# Patient Record
Sex: FEMALE | Race: WHITE | NOT HISPANIC OR LATINO | Employment: OTHER | URBAN - METROPOLITAN AREA
[De-identification: names, ages, dates, MRNs, and addresses within clinical notes are randomized per-mention and may not be internally consistent; named-entity substitution may affect disease eponyms.]

---

## 2017-07-26 ENCOUNTER — HOSPITAL ENCOUNTER (EMERGENCY)
Facility: HOSPITAL | Age: 52
Discharge: HOME | End: 2017-07-26
Attending: EMERGENCY MEDICINE | Admitting: EMERGENCY MEDICINE

## 2017-07-26 ENCOUNTER — APPOINTMENT (OUTPATIENT)
Dept: CT IMAGING | Facility: HOSPITAL | Age: 52
End: 2017-07-26
Attending: EMERGENCY MEDICINE

## 2017-07-26 VITALS
TEMPERATURE: 98 F | BODY MASS INDEX: 18.48 KG/M2 | HEART RATE: 69 BPM | OXYGEN SATURATION: 98 % | HEIGHT: 66 IN | SYSTOLIC BLOOD PRESSURE: 126 MMHG | RESPIRATION RATE: 20 BRPM | DIASTOLIC BLOOD PRESSURE: 79 MMHG | WEIGHT: 115 LBS

## 2017-07-26 DIAGNOSIS — R42 DIZZY: Primary | ICD-10-CM

## 2017-07-26 LAB
ALBUMIN SERPL-MCNC: 3.9 G/DL (ref 3.5–5.2)
ALBUMIN/GLOB SERPL: 1.5 G/DL
ALP SERPL-CCNC: 68 U/L (ref 40–129)
ALT SERPL W P-5'-P-CCNC: 14 U/L (ref 5–33)
AMPHET+METHAMPHET UR QL: NEGATIVE
AMPHETAMINES UR QL: NEGATIVE
ANION GAP SERPL CALCULATED.3IONS-SCNC: 10.4 MMOL/L
AST SERPL-CCNC: 18 U/L (ref 5–32)
BARBITURATES UR QL SCN: NEGATIVE
BASOPHILS # BLD AUTO: 0.07 10*3/MM3 (ref 0–0.2)
BASOPHILS NFR BLD AUTO: 0.8 % (ref 0–2)
BENZODIAZ UR QL SCN: NEGATIVE
BILIRUB SERPL-MCNC: 0.2 MG/DL (ref 0.2–1.2)
BUN BLD-MCNC: 5 MG/DL (ref 6–20)
BUN/CREAT SERPL: 8.2 (ref 7–25)
BUPRENORPHINE SERPL-MCNC: NEGATIVE NG/ML
CALCIUM SPEC-SCNC: 8.8 MG/DL (ref 8.6–10.5)
CANNABINOIDS SERPL QL: NEGATIVE
CHLORIDE SERPL-SCNC: 103 MMOL/L (ref 98–107)
CO2 SERPL-SCNC: 28.6 MMOL/L (ref 22–29)
COCAINE UR QL: NEGATIVE
CREAT BLD-MCNC: 0.61 MG/DL (ref 0.57–1)
DEPRECATED RDW RBC AUTO: 44.8 FL (ref 37–54)
EOSINOPHIL # BLD AUTO: 0.24 10*3/MM3 (ref 0.1–0.3)
EOSINOPHIL NFR BLD AUTO: 2.8 % (ref 0–4)
ERYTHROCYTE [DISTWIDTH] IN BLOOD BY AUTOMATED COUNT: 13.2 % (ref 11.5–14.5)
GFR SERPL CREATININE-BSD FRML MDRD: 103 ML/MIN/1.73
GLOBULIN UR ELPH-MCNC: 2.6 GM/DL
GLUCOSE BLD-MCNC: 91 MG/DL (ref 65–99)
HCT VFR BLD AUTO: 40.5 % (ref 37–47)
HGB BLD-MCNC: 13.2 G/DL (ref 12–16)
IMM GRANULOCYTES # BLD: 0.02 10*3/MM3 (ref 0–0.03)
IMM GRANULOCYTES NFR BLD: 0.2 % (ref 0–0.5)
LYMPHOCYTES # BLD AUTO: 2.31 10*3/MM3 (ref 0.6–4.8)
LYMPHOCYTES NFR BLD AUTO: 27.2 % (ref 20–45)
MCH RBC QN AUTO: 30.1 PG (ref 27–31)
MCHC RBC AUTO-ENTMCNC: 32.6 G/DL (ref 31–37)
MCV RBC AUTO: 92.5 FL (ref 81–99)
METHADONE UR QL SCN: NEGATIVE
MONOCYTES # BLD AUTO: 0.61 10*3/MM3 (ref 0–1)
MONOCYTES NFR BLD AUTO: 7.2 % (ref 3–8)
NEUTROPHILS # BLD AUTO: 5.24 10*3/MM3 (ref 1.5–8.3)
NEUTROPHILS NFR BLD AUTO: 61.8 % (ref 45–70)
NRBC BLD MANUAL-RTO: 0 /100 WBC (ref 0–0)
OPIATES UR QL: POSITIVE
OXYCODONE UR QL SCN: NEGATIVE
PCP UR QL SCN: NEGATIVE
PLATELET # BLD AUTO: 229 10*3/MM3 (ref 140–500)
PMV BLD AUTO: 10.1 FL (ref 7.4–10.4)
POTASSIUM BLD-SCNC: 4.3 MMOL/L (ref 3.5–5.2)
PROPOXYPH UR QL: NEGATIVE
PROT SERPL-MCNC: 6.5 G/DL (ref 6–8.5)
RBC # BLD AUTO: 4.38 10*6/MM3 (ref 4.2–5.4)
SODIUM BLD-SCNC: 142 MMOL/L (ref 136–145)
TRICYCLICS UR QL SCN: NEGATIVE
TROPONIN T SERPL-MCNC: <0.01 NG/ML (ref 0–0.03)
WBC NRBC COR # BLD: 8.49 10*3/MM3 (ref 4.8–10.8)

## 2017-07-26 PROCEDURE — 99284 EMERGENCY DEPT VISIT MOD MDM: CPT | Performed by: EMERGENCY MEDICINE

## 2017-07-26 PROCEDURE — 85025 COMPLETE CBC W/AUTO DIFF WBC: CPT | Performed by: EMERGENCY MEDICINE

## 2017-07-26 PROCEDURE — 93010 ELECTROCARDIOGRAM REPORT: CPT | Performed by: INTERNAL MEDICINE

## 2017-07-26 PROCEDURE — 80053 COMPREHEN METABOLIC PANEL: CPT | Performed by: EMERGENCY MEDICINE

## 2017-07-26 PROCEDURE — 84484 ASSAY OF TROPONIN QUANT: CPT | Performed by: EMERGENCY MEDICINE

## 2017-07-26 PROCEDURE — 99283 EMERGENCY DEPT VISIT LOW MDM: CPT

## 2017-07-26 PROCEDURE — 70450 CT HEAD/BRAIN W/O DYE: CPT

## 2017-07-26 PROCEDURE — 80306 DRUG TEST PRSMV INSTRMNT: CPT | Performed by: EMERGENCY MEDICINE

## 2017-07-26 PROCEDURE — 93005 ELECTROCARDIOGRAM TRACING: CPT | Performed by: EMERGENCY MEDICINE

## 2017-07-26 RX ORDER — CLONAZEPAM 0.5 MG/1
0.5 TABLET ORAL 2 TIMES DAILY PRN
Status: ON HOLD | COMMUNITY
End: 2019-02-27

## 2017-07-26 RX ORDER — MECLIZINE HCL 25MG 25 MG/1
25 TABLET, CHEWABLE ORAL DAILY PRN
Status: ON HOLD | COMMUNITY
End: 2019-02-27

## 2017-07-26 RX ORDER — ESCITALOPRAM OXALATE 20 MG/1
20 TABLET ORAL DAILY
Status: ON HOLD | COMMUNITY
End: 2019-02-27

## 2017-07-26 RX ORDER — MECLIZINE HYDROCHLORIDE 25 MG/1
25 TABLET ORAL 3 TIMES DAILY PRN
Qty: 30 TABLET | Refills: 0 | Status: ON HOLD | OUTPATIENT
Start: 2017-07-26 | End: 2019-02-27

## 2017-07-26 RX ORDER — HYDROCODONE BITARTRATE AND IBUPROFEN 7.5; 2 MG/1; MG/1
1 TABLET, FILM COATED ORAL EVERY 8 HOURS PRN
Status: ON HOLD | COMMUNITY
End: 2019-02-27

## 2017-07-26 RX ORDER — DEXTROAMPHETAMINE SACCHARATE, AMPHETAMINE ASPARTATE, DEXTROAMPHETAMINE SULFATE AND AMPHETAMINE SULFATE 2.5; 2.5; 2.5; 2.5 MG/1; MG/1; MG/1; MG/1
10 TABLET ORAL DAILY
COMMUNITY
End: 2020-05-13

## 2017-07-26 NOTE — ED NOTES
"Pt reports she has h/o staph in her face and fingers.  \"It feels real hard.  If it gets in my food it gets hard.  I can feel it under my fingernails, and it's hard\".     Marie Rand RN  07/26/17 5241    "

## 2017-07-26 NOTE — DISCHARGE INSTRUCTIONS
Follow-up with Dr. collado on August 1 as scheduled.  Return to the emergency department if you have worsening dizziness, headache, numbness, tingling, weakness, chest pain, shortness of breath, worse in any way at all.

## 2017-07-26 NOTE — ED PROVIDER NOTES
"Subjective   History of Present Illness  History of Present Illness    Chief complaint: Dizziness    Location: Home    Quality/Severity:  Moderate, feels like she is drunk intermittently, last 2-3 minutes at a time    Timing/Onset/Duration: This is been going on for last 2 weeks    Modifying Factors: Time makes it better, nothing makes it worse    Associated Symptoms: Patient denies any headache.  No fever chills or cough.  No sore throat earache or nasal congestion.  pain or shortness of breath.  No abdominal pain.  No diarrhea or burning when she urinates.  No change in the color of her stools.  The patient denies any meds or dosage changes.    Narrative: Is 51-year-old white female presents complaining of intermittent episodes of feeling like she is \"drunk\".  This is been going on for last 2 weeks.  The episodes last 2-3 minutes.  Sounds but not lost consciousness.  She denies any headache.  No neck or back pain.  No chest pain or shortness of breath.  No abdominal pain.  No diarrhea or burning when she urinates.  No change in color of her stools.  No heavy menstrual periods.  Has been no meds added urgently did or dosage changes.  There is no tinnitus.    PCP:  Sina      Review of Systems   Constitutional: Negative for chills and fever.   HENT: Negative for congestion, ear pain, sore throat and tinnitus.    Eyes: Negative for pain and discharge.   Respiratory: Negative for cough, chest tightness, shortness of breath and wheezing.    Cardiovascular: Negative for chest pain and palpitations.   Gastrointestinal: Negative for abdominal pain, blood in stool, constipation, diarrhea, nausea and vomiting.   Genitourinary: Negative for decreased urine volume, dysuria, flank pain, frequency, genital sores, hematuria, menstrual problem, pelvic pain, urgency, vaginal bleeding, vaginal discharge and vaginal pain.   Musculoskeletal: Negative for back pain.   Skin: Negative for rash.   Neurological: Positive for dizziness. " Negative for syncope, facial asymmetry, speech difficulty, weakness, numbness and headaches.   Hematological: Negative for adenopathy.   Psychiatric/Behavioral: Negative for confusion.        Medication List      CHANGE how you take these medications          * meclizine 25 MG chewable tablet chewable tablet   What changed:  Another medication with the same name was added. Make sure   you understand how and when to take each.       * meclizine 25 MG tablet   Commonly known as:  ANTIVERT   Take 1 tablet by mouth 3 (Three) Times a Day As Needed for dizziness.   What changed:  You were already taking a medication with the same name,   and this prescription was added. Make sure you understand how and when to   take each.       * Notice:  This list has 2 medication(s) that are the same as other   medications prescribed for you. Read the directions carefully, and ask   your doctor or other care provider to review them with you.      CONTINUE taking these medications          amphetamine-dextroamphetamine 10 MG tablet   Commonly known as:  ADDERALL       escitalopram 20 MG tablet   Commonly known as:  LEXAPRO       KLONOPIN 0.5 MG tablet   Generic drug:  clonazePAM       VICOPROFEN 7.5-200 MG per tablet   Generic drug:  HYDROcodone-ibuprofen       VYVANSE 10 MG capsule   Generic drug:  lisdexamfetamine dimesylate           Past Medical History:   Diagnosis Date   • ADHD (attention deficit hyperactivity disorder)    • Anxiety    • Back pain    • DDD (degenerative disc disease), cervical    • MRSA (methicillin resistant staph aureus) culture positive    • Neck pain    • Vertigo 2017       No Known Allergies    Past Surgical History:   Procedure Laterality Date   • HYSTERECTOMY     • OVARIAN CYST REMOVAL         History reviewed. No pertinent family history.    Social History     Social History   • Marital status:      Spouse name: N/A   • Number of children: N/A   • Years of education: N/A     Social History Main  Topics   • Smoking status: Current Every Day Smoker     Packs/day: 1.50     Years: 40.00     Types: Cigarettes   • Smokeless tobacco: Never Used   • Alcohol use No   • Drug use: No   • Sexual activity: Not Asked     Other Topics Concern   • None     Social History Narrative   • None           Objective   Physical Exam   Constitutional: She is oriented to person, place, and time. She appears well-developed and well-nourished. No distress.   ED Triage Vitals:  Temp: 98 °F (36.7 °C) (07/26/17 1203)  Heart Rate: 78 (07/26/17 1203)  Resp: 20 (07/26/17 1203)  BP: 125/76 (07/26/17 1203)  SpO2: 96 % (07/26/17 1203)  Temp src: Oral (07/26/17 1203)  Heart Rate Source: Monitor (07/26/17 1203)  Patient Position: Lying (07/26/17 1203)  BP Location: Right arm (07/26/17 1203)  FiO2 (%): n/a    The patient's vitals were reviewed by me.  Unless otherwise noted they are within normal limits.     HENT:   Head: Normocephalic and atraumatic.   Right Ear: External ear normal.   Left Ear: External ear normal.   Nose: Nose normal.   Mouth/Throat: Oropharynx is clear and moist.   Eyes: Conjunctivae and EOM are normal. Pupils are equal, round, and reactive to light. Right eye exhibits no discharge. Left eye exhibits no discharge.   Neck: Normal range of motion. Neck supple. No JVD present. No tracheal deviation present. No thyromegaly present.   No carotid bruits   Cardiovascular: Normal rate, regular rhythm, normal heart sounds and intact distal pulses.  Exam reveals no gallop and no friction rub.    No murmur heard.  Pulmonary/Chest: Effort normal and breath sounds normal. No stridor. No respiratory distress. She has no wheezes. She has no rales. She exhibits no tenderness.   Abdominal: Soft. Bowel sounds are normal. She exhibits no distension and no mass. There is no tenderness. There is no rebound and no guarding. No hernia.   Musculoskeletal: Normal range of motion. She exhibits no edema or deformity.   Lymphadenopathy:     She has no  cervical adenopathy.   Neurological: She is alert and oriented to person, place, and time. No cranial nerve deficit. She exhibits normal muscle tone. Coordination normal.   Skin: Skin is warm and dry. No rash noted. She is not diaphoretic. No erythema. No pallor.   Psychiatric: Her behavior is normal.   Nursing note and vitals reviewed.      Procedures         ED Course  ED Course   Comment By Time   The laboratory values were reviewed by me.  The opiate screen is positive.  The laboratory values are otherwise unremarkable. Clement May MD 07/26 1445    3:29 AM, 07/28/17:  The EKG was obtained at 1216.  The EKG was interpreted by me at 1216.  EKG shows a normal sinus rhythm with a rate of 69.  There is a normal axis.  There is left ventricular hypertrophy.  The AK, QRS and QT intervals are unremarkable.  There is no ectopy.  There is no acute ST elevation or depression.  3:29 AM, 07/28/17:  The patient's orthostatics are negative.    3:29 AM, 07/28/17:  Patient was reassessed.  She feels better.  Her vital signs were reviewed and are stable.    Neurological exam: Conscious alert oriented ×4 with no focal deficit is noted.    3:29 AM, 07/28/17:  The patient's diagnosis of dizziness was discussed with the patient.  The patient will be given a prescription for meclizine.  She should follow-up with Dr. Andino as scheduled on August 1.  Her dizziness may be due to the medications that she is on and these may need to be adjusted by her primary care provider.  Patient is on Adderall, Klonopin, Lexapro, hydrocodone, all of which may cause dizziness.  The patient should return to the emergency department if she has worsening dizziness, worse in any way at all.          MDM    Final diagnoses:   Dizzy            Clement May MD  CT Head Without Contrast   ED Interpretation   CT of the head as read by Dr. King Foley shows no acute   intracranial findings.      Final Result   No acute intracranial findings       This  report was finalized on 7/26/2017 3:47 PM by Dr. King Foley MD.            Labs Reviewed   COMPREHENSIVE METABOLIC PANEL - Abnormal; Notable for the following:        Result Value    BUN 5 (*)     All other components within normal limits   URINE DRUG SCREEN - Abnormal; Notable for the following:     Opiate Screen Positive (*)     All other components within normal limits    Narrative:     Urine drug screen results are to be used for medical purposes only.  They are not to be used for legal purposes such as employment testing.  Negative results do not necessarily mean the complete absence of a subtance, but rather that the result is less than the cutoff for that substance.  Positive results are unconfirmed and considered Preliminary Positive.  [facility] does not automatically confirm Postitive Unconfirmed results.  The physician may request (order) an Unconfirmed Positive result to be sent out for confirmation.      Negative Thresholds for Drugs Screened:    THC screen, urine                          50 ng/ml  Phenycyclidine (PCP), urine                25 ng/ml  Cocaine screen, urine                     150 ng/ml  Methamphetamine, urine                    500 ng/ml  Opiate screen, urine                      100 ng/ml  Amphetamine screen, urine                 500 ng/ml  Benzodiazepine screen, urine              150 ng/ml  Tricyclic Antidepressants screen, urine   300 ng/ml  Methadone screen, urine                   200 ng/ml  Barbiturates screen, urine                200 ng/ml  Oxycodone screen, urine                   100 ng/ml  Propoxyphene screen, urine                300 ng/ml  Buprenorphine screen, urine                10 ng/ml   TROPONIN (IN-HOUSE) - Normal    Narrative:     Troponin T Reference Ranges:  Less than 0.03 ng/mL:    Negative for AMI  0.03 to 0.09 ng/mL:      Indeterminant for AMI  Greater than 0.09 ng/mL: Positive for AMI   CBC WITH AUTO DIFFERENTIAL - Normal   CBC AND  DIFFERENTIAL    Narrative:     The following orders were created for panel order CBC & Differential.  Procedure                               Abnormality         Status                     ---------                               -----------         ------                     CBC Auto Differential[[id number]]        Normal              Final result                 Please view results for these tests on the individual orders.     Ct Head Without Contrast    Result Date: 7/26/2017  Narrative: INDICATION: Dizziness and weakness for one day. Vertigo.  TECHNIQUE: CT head without contrast.  Radiation dose reduction techniques were utilized, including automated exposure control and exposure modulation based on body size.  COMPARISON: None available..  FINDINGS: No acute intracranial hemorrhage, mass lesion, or acute infarct. Gray-white matter differentiation is normal. The ventricles and basilar cisterns are normal in size and configuration. No extra-axial collections.  No acute osseous abnormalities. The visualized paranasal sinuses and mastoid air cells are clear.      Impression: No acute intracranial findings  This report was finalized on 7/26/2017 3:47 PM by Dr. King Foley MD.        Final diagnoses:   Dizzy         ED Medications:  Medications - No data to display    New Medications:     Medication List      CHANGE how you take these medications          * meclizine 25 MG chewable tablet chewable tablet   What changed:  Another medication with the same name was added. Make sure   you understand how and when to take each.       * meclizine 25 MG tablet   Commonly known as:  ANTIVERT   Take 1 tablet by mouth 3 (Three) Times a Day As Needed for dizziness.   What changed:  You were already taking a medication with the same name,   and this prescription was added. Make sure you understand how and when to   take each.       * Notice:  This list has 2 medication(s) that are the same as other   medications prescribed for  you. Read the directions carefully, and ask   your doctor or other care provider to review them with you.      CONTINUE taking these medications          amphetamine-dextroamphetamine 10 MG tablet   Commonly known as:  ADDERALL       escitalopram 20 MG tablet   Commonly known as:  LEXAPRO       KLONOPIN 0.5 MG tablet   Generic drug:  clonazePAM       VICOPROFEN 7.5-200 MG per tablet   Generic drug:  HYDROcodone-ibuprofen       VYVANSE 10 MG capsule   Generic drug:  lisdexamfetamine dimesylate           Stopped Medications:     Medication List      CHANGE how you take these medications          * meclizine 25 MG chewable tablet chewable tablet   What changed:  Another medication with the same name was added. Make sure   you understand how and when to take each.       * meclizine 25 MG tablet   Commonly known as:  ANTIVERT   Take 1 tablet by mouth 3 (Three) Times a Day As Needed for dizziness.   What changed:  You were already taking a medication with the same name,   and this prescription was added. Make sure you understand how and when to   take each.       * Notice:  This list has 2 medication(s) that are the same as other   medications prescribed for you. Read the directions carefully, and ask   your doctor or other care provider to review them with you.      CONTINUE taking these medications          amphetamine-dextroamphetamine 10 MG tablet   Commonly known as:  ADDERALL       escitalopram 20 MG tablet   Commonly known as:  LEXAPRO       KLONOPIN 0.5 MG tablet   Generic drug:  clonazePAM       VICOPROFEN 7.5-200 MG per tablet   Generic drug:  HYDROcodone-ibuprofen       VYVANSE 10 MG capsule   Generic drug:  lisdexamfetamine dimesylate           07/26/17 1422       Clement May MD  07/26/17 1459       Clement May MD  07/28/17 8502

## 2017-07-26 NOTE — ED NOTES
Pt reports cough x2 weeks, dry, nonproductive, chills, fatigue.       Marie Rand RN  07/26/17 8227

## 2018-01-22 ENCOUNTER — LAB (OUTPATIENT)
Dept: LAB | Facility: HOSPITAL | Age: 53
End: 2018-01-22
Attending: FAMILY MEDICINE

## 2018-01-22 ENCOUNTER — TRANSCRIBE ORDERS (OUTPATIENT)
Dept: ADMINISTRATIVE | Facility: HOSPITAL | Age: 53
End: 2018-01-22

## 2018-01-22 ENCOUNTER — HOSPITAL ENCOUNTER (OUTPATIENT)
Dept: GENERAL RADIOLOGY | Facility: HOSPITAL | Age: 53
Discharge: HOME | End: 2018-01-22
Attending: FAMILY MEDICINE | Admitting: FAMILY MEDICINE

## 2018-01-22 DIAGNOSIS — J40 BRONCHITIS: Primary | ICD-10-CM

## 2018-01-22 DIAGNOSIS — J40 BRONCHITIS: ICD-10-CM

## 2018-01-22 PROCEDURE — 87070 CULTURE OTHR SPECIMN AEROBIC: CPT

## 2018-01-22 PROCEDURE — 87205 SMEAR GRAM STAIN: CPT

## 2018-01-22 PROCEDURE — 71046 X-RAY EXAM CHEST 2 VIEWS: CPT

## 2018-01-24 LAB
BACTERIA SPEC RESP CULT: NORMAL
BACTERIA SPEC RESP CULT: NORMAL
GRAM STN SPEC: NORMAL

## 2019-02-26 ENCOUNTER — APPOINTMENT (OUTPATIENT)
Dept: GENERAL RADIOLOGY | Facility: HOSPITAL | Age: 54
End: 2019-02-26

## 2019-02-26 ENCOUNTER — HOSPITAL ENCOUNTER (INPATIENT)
Facility: HOSPITAL | Age: 54
LOS: 3 days | Discharge: HOME | End: 2019-03-02
Attending: EMERGENCY MEDICINE | Admitting: INTERNAL MEDICINE

## 2019-02-26 DIAGNOSIS — N12 PYELONEPHRITIS: ICD-10-CM

## 2019-02-26 DIAGNOSIS — A41.9 SEPSIS, DUE TO UNSPECIFIED ORGANISM: Primary | ICD-10-CM

## 2019-02-26 PROCEDURE — 87077 CULTURE AEROBIC IDENTIFY: CPT | Performed by: EMERGENCY MEDICINE

## 2019-02-26 PROCEDURE — 99285 EMERGENCY DEPT VISIT HI MDM: CPT

## 2019-02-26 PROCEDURE — 83605 ASSAY OF LACTIC ACID: CPT | Performed by: EMERGENCY MEDICINE

## 2019-02-26 PROCEDURE — 87150 DNA/RNA AMPLIFIED PROBE: CPT | Performed by: EMERGENCY MEDICINE

## 2019-02-26 PROCEDURE — 81015 MICROSCOPIC EXAM OF URINE: CPT | Performed by: EMERGENCY MEDICINE

## 2019-02-26 PROCEDURE — 87040 BLOOD CULTURE FOR BACTERIA: CPT | Performed by: EMERGENCY MEDICINE

## 2019-02-26 PROCEDURE — 71046 X-RAY EXAM CHEST 2 VIEWS: CPT

## 2019-02-26 PROCEDURE — 85025 COMPLETE CBC W/AUTO DIFF WBC: CPT | Performed by: EMERGENCY MEDICINE

## 2019-02-26 PROCEDURE — 81003 URINALYSIS AUTO W/O SCOPE: CPT | Performed by: EMERGENCY MEDICINE

## 2019-02-26 PROCEDURE — 87186 SC STD MICRODIL/AGAR DIL: CPT | Performed by: EMERGENCY MEDICINE

## 2019-02-26 PROCEDURE — 80053 COMPREHEN METABOLIC PANEL: CPT | Performed by: EMERGENCY MEDICINE

## 2019-02-26 RX ORDER — IBUPROFEN 400 MG/1
800 TABLET ORAL ONCE
Status: COMPLETED | OUTPATIENT
Start: 2019-02-26 | End: 2019-02-27

## 2019-02-26 RX ORDER — SODIUM CHLORIDE 0.9 % (FLUSH) 0.9 %
10 SYRINGE (ML) INJECTION AS NEEDED
Status: DISCONTINUED | OUTPATIENT
Start: 2019-02-26 | End: 2019-03-02 | Stop reason: HOSPADM

## 2019-02-27 ENCOUNTER — APPOINTMENT (OUTPATIENT)
Dept: CT IMAGING | Facility: HOSPITAL | Age: 54
End: 2019-02-27

## 2019-02-27 PROBLEM — A41.9 SEPSIS (HCC): Status: ACTIVE | Noted: 2019-02-27

## 2019-02-27 LAB
ALBUMIN SERPL-MCNC: 3.2 G/DL (ref 3.5–5.2)
ALBUMIN/GLOB SERPL: 0.9 G/DL
ALP SERPL-CCNC: 89 U/L (ref 40–129)
ALT SERPL W P-5'-P-CCNC: 16 U/L (ref 5–33)
ANION GAP SERPL CALCULATED.3IONS-SCNC: 13 MMOL/L
AST SERPL-CCNC: 19 U/L (ref 5–32)
BACTERIA BLD CULT: ABNORMAL
BACTERIA UR QL AUTO: ABNORMAL /HPF
BASOPHILS # BLD AUTO: 0.03 10*3/MM3 (ref 0–0.2)
BASOPHILS NFR BLD AUTO: 0.3 % (ref 0–1.5)
BILIRUB SERPL-MCNC: 0.9 MG/DL (ref 0.2–1.2)
BILIRUB UR QL STRIP: NEGATIVE
BUN BLD-MCNC: 10 MG/DL (ref 6–20)
BUN/CREAT SERPL: 9.4 (ref 7–25)
CALCIUM SPEC-SCNC: 8 MG/DL (ref 8.6–10.5)
CHLORIDE SERPL-SCNC: 94 MMOL/L (ref 98–107)
CLARITY UR: ABNORMAL
CO2 SERPL-SCNC: 24 MMOL/L (ref 22–29)
COLOR UR: YELLOW
CREAT BLD-MCNC: 1.06 MG/DL (ref 0.57–1)
D-LACTATE SERPL-SCNC: 0.7 MMOL/L (ref 0.5–2)
D-LACTATE SERPL-SCNC: 2.3 MMOL/L (ref 0.5–2)
DEPRECATED RDW RBC AUTO: 45.1 FL (ref 37–54)
EOSINOPHIL # BLD AUTO: 0.03 10*3/MM3 (ref 0–0.4)
EOSINOPHIL NFR BLD AUTO: 0.3 % (ref 0.3–6.2)
ERYTHROCYTE [DISTWIDTH] IN BLOOD BY AUTOMATED COUNT: 13 % (ref 12.3–15.4)
FLUAV AG NPH QL: NEGATIVE
FLUBV AG NPH QL IA: NEGATIVE
GFR SERPL CREATININE-BSD FRML MDRD: 54 ML/MIN/1.73
GLOBULIN UR ELPH-MCNC: 3.5 GM/DL
GLUCOSE BLD-MCNC: 93 MG/DL (ref 65–99)
GLUCOSE UR STRIP-MCNC: NEGATIVE MG/DL
HCT VFR BLD AUTO: 42.9 % (ref 34–46.6)
HGB BLD-MCNC: 13.7 G/DL (ref 12–15.9)
HGB UR QL STRIP.AUTO: ABNORMAL
HOLD SPECIMEN: NORMAL
HYALINE CASTS UR QL AUTO: ABNORMAL /LPF
IMM GRANULOCYTES # BLD AUTO: 0.1 10*3/MM3 (ref 0–0.05)
IMM GRANULOCYTES NFR BLD AUTO: 0.8 % (ref 0–0.5)
KETONES UR QL STRIP: NEGATIVE
LEUKOCYTE ESTERASE UR QL STRIP.AUTO: ABNORMAL
LYMPHOCYTES # BLD AUTO: 0.59 10*3/MM3 (ref 0.7–3.1)
LYMPHOCYTES NFR BLD AUTO: 5 % (ref 19.6–45.3)
MCH RBC QN AUTO: 30.2 PG (ref 26.6–33)
MCHC RBC AUTO-ENTMCNC: 31.9 G/DL (ref 31.5–35.7)
MCV RBC AUTO: 94.7 FL (ref 79–97)
MONOCYTES # BLD AUTO: 0.98 10*3/MM3 (ref 0.1–0.9)
MONOCYTES NFR BLD AUTO: 8.3 % (ref 5–12)
NEUTROPHILS # BLD AUTO: 10.09 10*3/MM3 (ref 1.4–7)
NEUTROPHILS NFR BLD AUTO: 85.3 % (ref 42.7–76)
NITRITE UR QL STRIP: POSITIVE
NRBC BLD AUTO-RTO: 0 /100 WBC (ref 0–0)
PH UR STRIP.AUTO: 6 [PH] (ref 4.5–8)
PLATELET # BLD AUTO: 143 10*3/MM3 (ref 140–450)
PMV BLD AUTO: 11.7 FL (ref 6–12)
POTASSIUM BLD-SCNC: 3.5 MMOL/L (ref 3.5–5.2)
PROT SERPL-MCNC: 6.7 G/DL (ref 6–8.5)
PROT UR QL STRIP: ABNORMAL
RBC # BLD AUTO: 4.53 10*6/MM3 (ref 3.77–5.28)
RBC # UR: ABNORMAL /HPF
REF LAB TEST METHOD: ABNORMAL
SODIUM BLD-SCNC: 131 MMOL/L (ref 136–145)
SP GR UR STRIP: 1.02 (ref 1–1.03)
SQUAMOUS #/AREA URNS HPF: ABNORMAL /HPF
UROBILINOGEN UR QL STRIP: ABNORMAL
WBC NRBC COR # BLD: 11.82 10*3/MM3 (ref 3.4–10.8)
WBC UR QL AUTO: ABNORMAL /HPF
WHOLE BLOOD HOLD SPECIMEN: NORMAL
WHOLE BLOOD HOLD SPECIMEN: NORMAL

## 2019-02-27 PROCEDURE — 87804 INFLUENZA ASSAY W/OPTIC: CPT | Performed by: EMERGENCY MEDICINE

## 2019-02-27 PROCEDURE — 25010000002 ONDANSETRON PER 1 MG: Performed by: FAMILY MEDICINE

## 2019-02-27 PROCEDURE — 25010000002 CEFTRIAXONE SODIUM-DEXTROSE 1-3.74 GM-%(50ML) RECONSTITUTED SOLUTION: Performed by: FAMILY MEDICINE

## 2019-02-27 PROCEDURE — 94799 UNLISTED PULMONARY SVC/PX: CPT

## 2019-02-27 PROCEDURE — 74177 CT ABD & PELVIS W/CONTRAST: CPT

## 2019-02-27 PROCEDURE — 25010000002 CEFTRIAXONE SODIUM-DEXTROSE 1-3.74 GM-%(50ML) RECONSTITUTED SOLUTION: Performed by: EMERGENCY MEDICINE

## 2019-02-27 PROCEDURE — 25010000002 KETOROLAC TROMETHAMINE PER 15 MG: Performed by: FAMILY MEDICINE

## 2019-02-27 PROCEDURE — 25010000002 IOPAMIDOL 61 % SOLUTION: Performed by: FAMILY MEDICINE

## 2019-02-27 PROCEDURE — 83605 ASSAY OF LACTIC ACID: CPT | Performed by: EMERGENCY MEDICINE

## 2019-02-27 PROCEDURE — 87040 BLOOD CULTURE FOR BACTERIA: CPT | Performed by: EMERGENCY MEDICINE

## 2019-02-27 PROCEDURE — 25010000002 ENOXAPARIN PER 10 MG: Performed by: FAMILY MEDICINE

## 2019-02-27 PROCEDURE — 99291 CRITICAL CARE FIRST HOUR: CPT | Performed by: EMERGENCY MEDICINE

## 2019-02-27 RX ORDER — SODIUM CHLORIDE 9 MG/ML
75 INJECTION, SOLUTION INTRAVENOUS CONTINUOUS
Status: DISCONTINUED | OUTPATIENT
Start: 2019-02-27 | End: 2019-03-01

## 2019-02-27 RX ORDER — HYDROCODONE BITARTRATE AND ACETAMINOPHEN 5; 325 MG/1; MG/1
1 TABLET ORAL ONCE
Status: COMPLETED | OUTPATIENT
Start: 2019-02-27 | End: 2019-02-27

## 2019-02-27 RX ORDER — GABAPENTIN 400 MG/1
CAPSULE ORAL
Status: COMPLETED
Start: 2019-02-27 | End: 2019-02-27

## 2019-02-27 RX ORDER — CEFTRIAXONE 1 G/50ML
1 INJECTION, SOLUTION INTRAVENOUS
Status: DISCONTINUED | OUTPATIENT
Start: 2019-02-27 | End: 2019-02-27 | Stop reason: CLARIF

## 2019-02-27 RX ORDER — TRAMADOL HYDROCHLORIDE 50 MG/1
50 TABLET ORAL EVERY 6 HOURS PRN
Status: DISCONTINUED | OUTPATIENT
Start: 2019-02-27 | End: 2019-03-02 | Stop reason: HOSPADM

## 2019-02-27 RX ORDER — KETOROLAC TROMETHAMINE 30 MG/ML
30 INJECTION, SOLUTION INTRAMUSCULAR; INTRAVENOUS EVERY 6 HOURS PRN
Status: DISCONTINUED | OUTPATIENT
Start: 2019-02-27 | End: 2019-02-27

## 2019-02-27 RX ORDER — KETOROLAC TROMETHAMINE 30 MG/ML
INJECTION, SOLUTION INTRAMUSCULAR; INTRAVENOUS
Status: DISPENSED
Start: 2019-02-27 | End: 2019-02-28

## 2019-02-27 RX ORDER — ACETAMINOPHEN 325 MG/1
650 TABLET ORAL EVERY 4 HOURS PRN
Status: DISCONTINUED | OUTPATIENT
Start: 2019-02-27 | End: 2019-03-02 | Stop reason: HOSPADM

## 2019-02-27 RX ORDER — CEFTRIAXONE 1 G/50ML
1 INJECTION, SOLUTION INTRAVENOUS EVERY 24 HOURS
Status: DISCONTINUED | OUTPATIENT
Start: 2019-02-27 | End: 2019-03-02 | Stop reason: HOSPADM

## 2019-02-27 RX ORDER — GABAPENTIN 800 MG/1
800 TABLET ORAL 3 TIMES DAILY
COMMUNITY
End: 2020-05-13

## 2019-02-27 RX ORDER — CEFTRIAXONE 1 G/50ML
1 INJECTION, SOLUTION INTRAVENOUS ONCE
Status: COMPLETED | OUTPATIENT
Start: 2019-02-27 | End: 2019-02-27

## 2019-02-27 RX ORDER — ONDANSETRON 2 MG/ML
4 INJECTION INTRAMUSCULAR; INTRAVENOUS EVERY 8 HOURS PRN
Status: DISCONTINUED | OUTPATIENT
Start: 2019-02-27 | End: 2019-03-02 | Stop reason: HOSPADM

## 2019-02-27 RX ORDER — GABAPENTIN 400 MG/1
800 CAPSULE ORAL 3 TIMES DAILY
Status: DISCONTINUED | OUTPATIENT
Start: 2019-02-27 | End: 2019-03-02 | Stop reason: HOSPADM

## 2019-02-27 RX ORDER — KETOROLAC TROMETHAMINE 30 MG/ML
30 INJECTION, SOLUTION INTRAMUSCULAR; INTRAVENOUS ONCE
Status: COMPLETED | OUTPATIENT
Start: 2019-02-27 | End: 2019-02-27

## 2019-02-27 RX ORDER — GABAPENTIN 400 MG/1
800 CAPSULE ORAL 3 TIMES DAILY
Status: DISCONTINUED | OUTPATIENT
Start: 2019-02-27 | End: 2019-02-27 | Stop reason: CLARIF

## 2019-02-27 RX ADMIN — IOPAMIDOL 100 ML: 612 INJECTION, SOLUTION INTRAVENOUS at 09:30

## 2019-02-27 RX ADMIN — ACETAMINOPHEN 650 MG: 325 TABLET, FILM COATED ORAL at 09:51

## 2019-02-27 RX ADMIN — SODIUM CHLORIDE 1000 ML: 9 INJECTION, SOLUTION INTRAVENOUS at 00:08

## 2019-02-27 RX ADMIN — SODIUM CHLORIDE 1000 ML: 9 INJECTION, SOLUTION INTRAVENOUS at 09:47

## 2019-02-27 RX ADMIN — SODIUM CHLORIDE 1000 ML: 9 INJECTION, SOLUTION INTRAVENOUS at 01:30

## 2019-02-27 RX ADMIN — CEFTRIAXONE 1 G: 1 INJECTION, SOLUTION INTRAVENOUS at 00:57

## 2019-02-27 RX ADMIN — IBUPROFEN 800 MG: 400 TABLET ORAL at 00:08

## 2019-02-27 RX ADMIN — ACETAMINOPHEN 650 MG: 325 TABLET, FILM COATED ORAL at 13:37

## 2019-02-27 RX ADMIN — SODIUM CHLORIDE 150 ML/HR: 9 INJECTION, SOLUTION INTRAVENOUS at 02:50

## 2019-02-27 RX ADMIN — SODIUM CHLORIDE 150 ML/HR: 9 INJECTION, SOLUTION INTRAVENOUS at 13:28

## 2019-02-27 RX ADMIN — SODIUM CHLORIDE 150 ML/HR: 9 INJECTION, SOLUTION INTRAVENOUS at 20:01

## 2019-02-27 RX ADMIN — SODIUM CHLORIDE 150 ML/HR: 9 INJECTION, SOLUTION INTRAVENOUS at 08:53

## 2019-02-27 RX ADMIN — SODIUM CHLORIDE, PRESERVATIVE FREE 10 ML: 5 INJECTION INTRAVENOUS at 09:51

## 2019-02-27 RX ADMIN — SODIUM CHLORIDE 1000 ML: 9 INJECTION, SOLUTION INTRAVENOUS at 08:17

## 2019-02-27 RX ADMIN — GABAPENTIN 800 MG: 400 CAPSULE ORAL at 18:55

## 2019-02-27 RX ADMIN — HYDROCODONE BITARTRATE AND ACETAMINOPHEN 1 TABLET: 5; 325 TABLET ORAL at 01:50

## 2019-02-27 RX ADMIN — ENOXAPARIN SODIUM 40 MG: 40 INJECTION SUBCUTANEOUS at 09:41

## 2019-02-27 RX ADMIN — ACETAMINOPHEN 650 MG: 325 TABLET, FILM COATED ORAL at 17:27

## 2019-02-27 RX ADMIN — SODIUM CHLORIDE 500 ML: 900 INJECTION, SOLUTION INTRAVENOUS at 07:37

## 2019-02-27 RX ADMIN — CEFTRIAXONE 1 G: 1 INJECTION, SOLUTION INTRAVENOUS at 18:56

## 2019-02-27 RX ADMIN — ONDANSETRON 4 MG: 2 INJECTION, SOLUTION INTRAMUSCULAR; INTRAVENOUS at 09:51

## 2019-02-27 RX ADMIN — KETOROLAC TROMETHAMINE 30 MG: 30 INJECTION INTRAMUSCULAR; INTRAVENOUS at 21:56

## 2019-02-28 LAB
ANION GAP SERPL CALCULATED.3IONS-SCNC: 10.7 MMOL/L
BUN BLD-MCNC: 8 MG/DL (ref 6–20)
BUN/CREAT SERPL: 8.9 (ref 7–25)
CALCIUM SPEC-SCNC: 7.5 MG/DL (ref 8.6–10.5)
CHLORIDE SERPL-SCNC: 111 MMOL/L (ref 98–107)
CO2 SERPL-SCNC: 22.3 MMOL/L (ref 22–29)
CREAT BLD-MCNC: 0.9 MG/DL (ref 0.57–1)
DEPRECATED RDW RBC AUTO: 45.9 FL (ref 37–54)
ERYTHROCYTE [DISTWIDTH] IN BLOOD BY AUTOMATED COUNT: 13.3 % (ref 12.3–15.4)
GFR SERPL CREATININE-BSD FRML MDRD: 65 ML/MIN/1.73
GLUCOSE BLD-MCNC: 78 MG/DL (ref 65–99)
HCT VFR BLD AUTO: 36.2 % (ref 34–46.6)
HGB BLD-MCNC: 11.5 G/DL (ref 12–15.9)
MCH RBC QN AUTO: 29.9 PG (ref 26.6–33)
MCHC RBC AUTO-ENTMCNC: 31.8 G/DL (ref 31.5–35.7)
MCV RBC AUTO: 94 FL (ref 79–97)
PLATELET # BLD AUTO: 126 10*3/MM3 (ref 140–450)
PMV BLD AUTO: 12.1 FL (ref 6–12)
POTASSIUM BLD-SCNC: 3.3 MMOL/L (ref 3.5–5.2)
RBC # BLD AUTO: 3.85 10*6/MM3 (ref 3.77–5.28)
SODIUM BLD-SCNC: 144 MMOL/L (ref 136–145)
WBC NRBC COR # BLD: 5.88 10*3/MM3 (ref 3.4–10.8)

## 2019-02-28 PROCEDURE — 25010000002 KETOROLAC TROMETHAMINE PER 15 MG: Performed by: FAMILY MEDICINE

## 2019-02-28 PROCEDURE — 85027 COMPLETE CBC AUTOMATED: CPT | Performed by: FAMILY MEDICINE

## 2019-02-28 PROCEDURE — 25010000002 ENOXAPARIN PER 10 MG: Performed by: FAMILY MEDICINE

## 2019-02-28 PROCEDURE — 80048 BASIC METABOLIC PNL TOTAL CA: CPT | Performed by: FAMILY MEDICINE

## 2019-02-28 PROCEDURE — 25010000002 CEFTRIAXONE SODIUM-DEXTROSE 1-3.74 GM-%(50ML) RECONSTITUTED SOLUTION: Performed by: FAMILY MEDICINE

## 2019-02-28 PROCEDURE — 94799 UNLISTED PULMONARY SVC/PX: CPT

## 2019-02-28 RX ORDER — POTASSIUM CHLORIDE 20 MEQ/1
40 TABLET, EXTENDED RELEASE ORAL ONCE
Status: COMPLETED | OUTPATIENT
Start: 2019-02-28 | End: 2019-02-28

## 2019-02-28 RX ORDER — PANTOPRAZOLE SODIUM 40 MG/1
40 TABLET, DELAYED RELEASE ORAL
Status: DISCONTINUED | OUTPATIENT
Start: 2019-02-28 | End: 2019-03-02 | Stop reason: HOSPADM

## 2019-02-28 RX ORDER — KETOROLAC TROMETHAMINE 30 MG/ML
30 INJECTION, SOLUTION INTRAMUSCULAR; INTRAVENOUS EVERY 6 HOURS PRN
Status: DISCONTINUED | OUTPATIENT
Start: 2019-02-28 | End: 2019-03-02 | Stop reason: HOSPADM

## 2019-02-28 RX ADMIN — ENOXAPARIN SODIUM 40 MG: 40 INJECTION SUBCUTANEOUS at 08:58

## 2019-02-28 RX ADMIN — KETOROLAC TROMETHAMINE 30 MG: 30 INJECTION, SOLUTION INTRAMUSCULAR at 08:54

## 2019-02-28 RX ADMIN — TRAMADOL HYDROCHLORIDE 50 MG: 50 TABLET, FILM COATED ORAL at 13:17

## 2019-02-28 RX ADMIN — GABAPENTIN 800 MG: 400 CAPSULE ORAL at 20:39

## 2019-02-28 RX ADMIN — PANTOPRAZOLE SODIUM 40 MG: 40 TABLET, DELAYED RELEASE ORAL at 08:48

## 2019-02-28 RX ADMIN — TRAMADOL HYDROCHLORIDE 50 MG: 50 TABLET, FILM COATED ORAL at 19:30

## 2019-02-28 RX ADMIN — TRAMADOL HYDROCHLORIDE 50 MG: 50 TABLET, FILM COATED ORAL at 05:06

## 2019-02-28 RX ADMIN — ACETAMINOPHEN 650 MG: 325 TABLET, FILM COATED ORAL at 00:28

## 2019-02-28 RX ADMIN — GABAPENTIN 800 MG: 400 CAPSULE ORAL at 16:37

## 2019-02-28 RX ADMIN — KETOROLAC TROMETHAMINE 30 MG: 30 INJECTION, SOLUTION INTRAMUSCULAR at 16:36

## 2019-02-28 RX ADMIN — SODIUM CHLORIDE 125 ML/HR: 9 INJECTION, SOLUTION INTRAVENOUS at 04:00

## 2019-02-28 RX ADMIN — POTASSIUM CHLORIDE 40 MEQ: 1500 TABLET, EXTENDED RELEASE ORAL at 07:10

## 2019-02-28 RX ADMIN — CEFTRIAXONE 1 G: 1 INJECTION, SOLUTION INTRAVENOUS at 18:27

## 2019-02-28 RX ADMIN — GABAPENTIN 800 MG: 400 CAPSULE ORAL at 08:48

## 2019-03-01 ENCOUNTER — APPOINTMENT (OUTPATIENT)
Dept: GENERAL RADIOLOGY | Facility: HOSPITAL | Age: 54
End: 2019-03-01

## 2019-03-01 LAB
ALBUMIN SERPL-MCNC: 2.4 G/DL (ref 3.5–5.2)
ALBUMIN/GLOB SERPL: 0.9 G/DL
ALP SERPL-CCNC: 169 U/L (ref 40–129)
ALT SERPL W P-5'-P-CCNC: 20 U/L (ref 5–33)
ANION GAP SERPL CALCULATED.3IONS-SCNC: 10.8 MMOL/L
AST SERPL-CCNC: 24 U/L (ref 5–32)
BACTERIA SPEC AEROBE CULT: ABNORMAL
BILIRUB SERPL-MCNC: 0.3 MG/DL (ref 0.2–1.2)
BUN BLD-MCNC: 8 MG/DL (ref 6–20)
BUN/CREAT SERPL: 10.4 (ref 7–25)
CALCIUM SPEC-SCNC: 7.3 MG/DL (ref 8.6–10.5)
CHLORIDE SERPL-SCNC: 109 MMOL/L (ref 98–107)
CO2 SERPL-SCNC: 19.2 MMOL/L (ref 22–29)
CREAT BLD-MCNC: 0.77 MG/DL (ref 0.57–1)
DEPRECATED RDW RBC AUTO: 45.8 FL (ref 37–54)
ERYTHROCYTE [DISTWIDTH] IN BLOOD BY AUTOMATED COUNT: 13.4 % (ref 12.3–15.4)
GFR SERPL CREATININE-BSD FRML MDRD: 78 ML/MIN/1.73
GLOBULIN UR ELPH-MCNC: 2.8 GM/DL
GLUCOSE BLD-MCNC: 92 MG/DL (ref 65–99)
GRAM STN SPEC: ABNORMAL
HCT VFR BLD AUTO: 32.4 % (ref 34–46.6)
HGB BLD-MCNC: 10.6 G/DL (ref 12–15.9)
ISOLATED FROM: ABNORMAL
MCH RBC QN AUTO: 29.9 PG (ref 26.6–33)
MCHC RBC AUTO-ENTMCNC: 32.7 G/DL (ref 31.5–35.7)
MCV RBC AUTO: 91.5 FL (ref 79–97)
PLATELET # BLD AUTO: 119 10*3/MM3 (ref 140–450)
PMV BLD AUTO: 10.7 FL (ref 6–12)
POTASSIUM BLD-SCNC: 3.1 MMOL/L (ref 3.5–5.2)
PROT SERPL-MCNC: 5.2 G/DL (ref 6–8.5)
RBC # BLD AUTO: 3.54 10*6/MM3 (ref 3.77–5.28)
SODIUM BLD-SCNC: 139 MMOL/L (ref 136–145)
WBC NRBC COR # BLD: 4.96 10*3/MM3 (ref 3.4–10.8)

## 2019-03-01 PROCEDURE — 25010000002 CEFTRIAXONE SODIUM-DEXTROSE 1-3.74 GM-%(50ML) RECONSTITUTED SOLUTION: Performed by: FAMILY MEDICINE

## 2019-03-01 PROCEDURE — 25010000002 ENOXAPARIN PER 10 MG: Performed by: FAMILY MEDICINE

## 2019-03-01 PROCEDURE — 25010000002 KETOROLAC TROMETHAMINE PER 15 MG: Performed by: FAMILY MEDICINE

## 2019-03-01 PROCEDURE — 94762 N-INVAS EAR/PLS OXIMTRY CONT: CPT

## 2019-03-01 PROCEDURE — 85027 COMPLETE CBC AUTOMATED: CPT | Performed by: FAMILY MEDICINE

## 2019-03-01 PROCEDURE — 71046 X-RAY EXAM CHEST 2 VIEWS: CPT

## 2019-03-01 PROCEDURE — 80053 COMPREHEN METABOLIC PANEL: CPT | Performed by: FAMILY MEDICINE

## 2019-03-01 RX ORDER — POTASSIUM CHLORIDE 20 MEQ/1
40 TABLET, EXTENDED RELEASE ORAL ONCE
Status: COMPLETED | OUTPATIENT
Start: 2019-03-01 | End: 2019-03-01

## 2019-03-01 RX ADMIN — KETOROLAC TROMETHAMINE 30 MG: 30 INJECTION, SOLUTION INTRAMUSCULAR at 04:30

## 2019-03-01 RX ADMIN — SODIUM CHLORIDE 75 ML/HR: 9 INJECTION, SOLUTION INTRAVENOUS at 03:58

## 2019-03-01 RX ADMIN — ENOXAPARIN SODIUM 40 MG: 40 INJECTION SUBCUTANEOUS at 08:50

## 2019-03-01 RX ADMIN — TRAMADOL HYDROCHLORIDE 50 MG: 50 TABLET, FILM COATED ORAL at 23:58

## 2019-03-01 RX ADMIN — GABAPENTIN 800 MG: 400 CAPSULE ORAL at 16:07

## 2019-03-01 RX ADMIN — KETOROLAC TROMETHAMINE 30 MG: 30 INJECTION, SOLUTION INTRAMUSCULAR at 18:07

## 2019-03-01 RX ADMIN — GABAPENTIN 800 MG: 400 CAPSULE ORAL at 08:50

## 2019-03-01 RX ADMIN — ACETAMINOPHEN 650 MG: 325 TABLET, FILM COATED ORAL at 11:21

## 2019-03-01 RX ADMIN — CEFTRIAXONE 1 G: 1 INJECTION, SOLUTION INTRAVENOUS at 18:07

## 2019-03-01 RX ADMIN — KETOROLAC TROMETHAMINE 30 MG: 30 INJECTION, SOLUTION INTRAMUSCULAR at 11:21

## 2019-03-01 RX ADMIN — TRAMADOL HYDROCHLORIDE 50 MG: 50 TABLET, FILM COATED ORAL at 18:07

## 2019-03-01 RX ADMIN — ACETAMINOPHEN 650 MG: 325 TABLET, FILM COATED ORAL at 16:12

## 2019-03-01 RX ADMIN — POTASSIUM CHLORIDE 40 MEQ: 1500 TABLET, EXTENDED RELEASE ORAL at 08:50

## 2019-03-01 RX ADMIN — GABAPENTIN 800 MG: 400 CAPSULE ORAL at 20:13

## 2019-03-01 RX ADMIN — TRAMADOL HYDROCHLORIDE 50 MG: 50 TABLET, FILM COATED ORAL at 11:21

## 2019-03-01 NOTE — PROGRESS NOTES
"Daily Progress Note:      Chief complaint: Follow-up of sepsis, E. coli bacteremia, pyelonephritis    Subjective: Much improved.  Patient's back pain abdominal pain is improved.  She is eating better no vomiting she remains afebrile    Vital Signs  Temp:  [98.3 °F (36.8 °C)-99.3 °F (37.4 °C)] 99.3 °F (37.4 °C)  Heart Rate:  [72-99] 93  Resp:  [20-22] 22  BP: ()/(59-85) 127/64  Oxygen Therapy  SpO2: 92 %  Pulse Oximetry Type: Continuous  Device (Oxygen Therapy): nasal cannula  Flow (L/min): 1}  Body mass index is 22.4 kg/m².  Flowsheet Rows      First Filed Value   Admission Height  142.2 cm (56\") Documented at 02/26/2019 2323   Admission Weight  52.2 kg (115 lb) Documented at 02/26/2019 2323                   Documented weights    02/26/19 2323 02/27/19 0310 02/28/19 0520 02/28/19 0703   Weight: 52.2 kg (115 lb) 58.6 kg (129 lb 4 oz) 63.6 kg (140 lb 3.4 oz) 63 kg (138 lb 12.8 oz)           Patient Vitals for the past 24 hrs:   BP Temp Temp src Pulse Resp SpO2   03/01/19 0400 127/64 99.3 °F (37.4 °C) Oral 93 -- 92 %   03/01/19 0200 136/72 -- -- 80 -- 95 %   03/01/19 0105 90/77 -- -- 81 -- 93 %   03/01/19 0005 120/59 -- -- 81 -- 92 %   02/28/19 2305 107/61 -- -- 79 -- 94 %   02/28/19 2205 108/67 -- -- 96 -- 93 %   02/28/19 2005 118/68 -- -- 83 -- 90 %   02/28/19 1830 -- -- -- 99 -- 94 %   02/28/19 1802 109/61 -- -- 96 22 91 %   02/28/19 1756 -- -- -- 94 -- 94 %   02/28/19 1741 -- -- -- 90 -- (!) 86 %   02/28/19 1732 -- -- -- 91 -- 90 %   02/28/19 1724 -- -- -- 91 -- 93 %   02/28/19 1702 130/69 -- -- 93 -- 92 %   02/28/19 1640 -- -- -- -- -- (!) 82 %   02/28/19 1602 148/85 -- -- 84 22 90 %   02/28/19 1500 133/78 98.3 °F (36.8 °C) Oral 74 -- 96 %   02/28/19 1300 116/66 -- -- 78 -- 95 %   02/28/19 1203 110/72 98.5 °F (36.9 °C) Oral 77 20 98 %   02/28/19 0952 -- -- -- 72 20 95 %       63 kg (138 lb 12.8 oz)      Intake/Output Summary (Last 24 hours) at 3/1/2019 0723  Last data filed at 2/28/2019 1827  Gross per 24 " hour   Intake 970 ml   Output 900 ml   Net 70 ml       Review of Systems   Constitutional: Positive for fatigue. Negative for activity change and appetite change.   HENT: Negative for congestion.    Respiratory: Positive for cough and shortness of breath (Occasional shortness of breath). Negative for chest tightness and wheezing.    Cardiovascular: Negative for chest pain.   Gastrointestinal: Negative for abdominal distention, abdominal pain, diarrhea, nausea and vomiting.   Endocrine: Negative for polyphagia and polyuria.   Genitourinary: Negative for frequency.   Musculoskeletal: Positive for back pain.   Skin: Negative for rash.   Neurological: Negative for light-headedness.   Hematological: Does not bruise/bleed easily.   Psychiatric/Behavioral: Negative for agitation and behavioral problems. The patient is nervous/anxious.        Physical Exam   Constitutional: She appears well-developed and well-nourished. She appears ill.   HENT:   Head: Normocephalic.   Mouth/Throat: Oropharynx is clear and moist.   Eyes: Conjunctivae are normal.   Neck: Normal range of motion. No JVD present. No thyromegaly present.   Cardiovascular: Normal rate, regular rhythm and normal heart sounds.   No murmur heard.  Pulmonary/Chest: Effort normal. No respiratory distress. She has decreased breath sounds in the right lower field and the left lower field. She has no wheezes. She has no rales.   Abdominal: Soft. Bowel sounds are normal. She exhibits no distension. There is no tenderness. There is no guarding.   Neurological: She is alert.   Skin: Skin is warm and dry. No rash noted.   Nursing note and vitals reviewed.      Medication Review:   I have reviewed the patient's current medication list  Scheduled Meds:  cefTRIAXone Sodium-Dextrose 1 g Intravenous Q24H   enoxaparin 40 mg Subcutaneous Q24H   gabapentin 800 mg Oral TID   pantoprazole 40 mg Oral Q AM   potassium chloride 40 mEq Oral Once     Continuous Infusions:   PRN Meds:.•   acetaminophen  •  ketorolac  •  ondansetron  •  sodium chloride  •  traMADol  @[email or web address]@      Labs:  Results from last 7 days   Lab Units 03/01/19 0515 02/28/19 0408 02/26/19 2356   WBC 10*3/mm3 4.96 5.88 11.82*   HEMOGLOBIN g/dL 10.6* 11.5* 13.7   HEMATOCRIT % 32.4* 36.2 42.9   PLATELETS 10*3/mm3 119* 126* 143     Results from last 7 days   Lab Units 03/01/19 0515 02/28/19 0408 02/26/19 2356   SODIUM mmol/L 139 144 131*   POTASSIUM mmol/L 3.1* 3.3* 3.5   CHLORIDE mmol/L 109* 111* 94*   CO2 mmol/L 19.2* 22.3 24.0   BUN mg/dL 8 8 10   CREATININE mg/dL 0.77 0.90 1.06*   CALCIUM mg/dL 7.3* 7.5* 8.0*   BILIRUBIN mg/dL 0.3  --  0.9   ALK PHOS U/L 169*  --  89   ALT (SGPT) U/L 20  --  16   AST (SGOT) U/L 24  --  19   GLUCOSE mg/dL 92 78 93           Lab Results (last 24 hours)     Procedure Component Value Units Date/Time    Blood Culture - Blood, Arm, Right [[id number]]  (Abnormal)  (Susceptibility) Collected:  02/26/19 2356    Specimen:  Blood from Arm, Right Updated:  03/01/19 0623     Blood Culture Escherichia coli     Isolated from Anaerobic Bottle     Gram Stain Anaerobic Bottle Gram negative bacilli    Susceptibility      Escherichia coli     ARUN     Ampicillin Resistant     Ampicillin + Sulbactam Susceptible     Cefazolin Susceptible     Cefepime Susceptible     Cefoxitin Susceptible     Ceftriaxone Susceptible     Ertapenem Susceptible     Gentamicin Susceptible     Levofloxacin Susceptible     Meropenem Susceptible     Piperacillin + Tazobactam Susceptible     Trimethoprim + Sulfamethoxazole Resistant                    Comprehensive Metabolic Panel [[id number]]  (Abnormal) Collected:  03/01/19 0515    Specimen:  Blood Updated:  03/01/19 0545     Glucose 92 mg/dL      BUN 8 mg/dL      Creatinine 0.77 mg/dL      Sodium 139 mmol/L      Potassium 3.1 mmol/L      Chloride 109 mmol/L      CO2 19.2 mmol/L      Calcium 7.3 mg/dL      Total Protein 5.2 g/dL      Albumin 2.40 g/dL      ALT (SGPT) 20 U/L      AST  (SGOT) 24 U/L      Alkaline Phosphatase 169 U/L      Total Bilirubin 0.3 mg/dL      eGFR Non African Amer 78 mL/min/1.73      Globulin 2.8 gm/dL      A/G Ratio 0.9 g/dL      BUN/Creatinine Ratio 10.4     Anion Gap 10.8 mmol/L     CBC (No Diff) [[id number]]  (Abnormal) Collected:  03/01/19 0515    Specimen:  Blood Updated:  03/01/19 0521     WBC 4.96 10*3/mm3      RBC 3.54 10*6/mm3      Hemoglobin 10.6 g/dL      Hematocrit 32.4 %      MCV 91.5 fL      MCH 29.9 pg      MCHC 32.7 g/dL      RDW 13.4 %      RDW-SD 45.8 fl      MPV 10.7 fL      Platelets 119 10*3/mm3     Blood Culture - Blood, Arm, Right [[id number]] Collected:  02/27/19 0004    Specimen:  Blood from Arm, Right Updated:  03/01/19 0015     Blood Culture No growth at 2 days    Narrative:       Less than seven (7) mls of blood was collected.  Insufficient quantity may yield false negative results.                                  Invalid input(s): LDLCALC          No results found for: POCGLU  Results from last 7 days   Lab Units 02/27/19  0401 02/26/19  2356   LACTATE mmol/L 0.7 2.3*     Results from last 7 days   Lab Units 02/27/19  0004 02/26/19  2356   BLOODCX  No growth at 2 days Escherichia coli*   BCIDPCR   --  Escherichia coli. Identification by BCID PCR.*     Results from last 7 days   Lab Units 02/26/19  2359   NITRITE UA  Positive*   WBC UA /HPF 0-2*   BACTERIA UA /HPF 3+*   SQUAM EPITHEL UA /HPF 0-2             Radiology:  Imaging Results (last 24 hours)     ** No results found for the last 24 hours. **          Cardiology:  ECG/EMG Results (last 24 hours)     ** No results found for the last 24 hours. **          I have reviewed recent labs results and consult notes.  Parts of this note may have been copied and pasted but patient was examined and interviewed by me today    Assessment and Plan:    1.  Sepsis has resolved her blood pressures are stable will DC IV fluids transfer patient to floor    2.  E. coli bacteremia we will continue with IV  Rocephin changed to p.o. antibiotics likely tomorrow    3.  Severe back and flank pain likely due to her pyelonephritis versus underlying lumbar degenerative disc disease which she has had problems with the past this has been responsive to IV Toradol and tramadol and Tylenol    4.  Patient having reported periods of desaturation in the mid 80s at night.  I was never informed will get a desaturation study chest x-ray performed    5.  Hypokalemia p.o. potassium is been ordered    6.  DVT prophylaxis Lovenox and SCDs    7.  Generalized anxiety disorder gabapentin is being continued and she is still having some panic attacks we will continue to monitor

## 2019-03-01 NOTE — PLAN OF CARE
Problem: Patient Care Overview  Goal: Plan of Care Review  Outcome: Ongoing (interventions implemented as appropriate)   03/01/19 0629   Coping/Psychosocial   Plan of Care Reviewed With patient   Plan of Care Review   Progress improving   OTHER   Outcome Summary had restful night. VSS, asking less for pain meds. SOA consistant with pain/anxiety

## 2019-03-01 NOTE — NURSING NOTE
Continued Stay Note  DIOGENES Silver     Patient Name: Olga Hansen  MRN: [id number]  Today's Date: 3/1/2019    Admit Date: 2/26/2019    Discharge Plan     Row Name 03/01/19 1454       Plan    Plan Comments  Patient in agreement with speaking to  with  at bedside.  She says she is feeling better today.  She says the plan remains going home when medically stable.  Patient has no needs at this time.   will continue to follow.         Discharge Codes    No documentation.             Domonique Babcock RN

## 2019-03-01 NOTE — PLAN OF CARE
Problem: Patient Care Overview  Goal: Plan of Care Review  Outcome: Ongoing (interventions implemented as appropriate)   03/01/19 1506   Coping/Psychosocial   Plan of Care Reviewed With patient;spouse   Plan of Care Review   Progress improving   OTHER   Outcome Summary Downgraded to MS from ICU today. Pt still on IV Rocephin. On room air. C/o back pain, but is controlled with Tylenol, Toradol, and Tramadol. Overnight sleep study tonight. Up ad ade.        Problem: Infection, Risk/Actual (Adult)  Goal: Identify Related Risk Factors and Signs and Symptoms  Outcome: Ongoing (interventions implemented as appropriate)   03/01/19 1506   Infection, Risk/Actual (Adult)   Related Risk Factors (Infection, Risk/Actual) sleep disturbance;treatment plan   Signs and Symptoms (Infection, Risk/Actual) pain     Goal: Infection Prevention/Resolution  Outcome: Ongoing (interventions implemented as appropriate)   03/01/19 1506   Infection, Risk/Actual (Adult)   Infection Prevention/Resolution making progress toward outcome       Problem: Pain, Acute (Adult)  Goal: Identify Related Risk Factors and Signs and Symptoms  Outcome: Ongoing (interventions implemented as appropriate)   03/01/19 1506   Pain, Acute (Adult)   Related Risk Factors (Acute Pain) infection;persistent pain;patient perception   Signs and Symptoms (Acute Pain) verbalization of pain descriptors;fatigue/weakness     Goal: Acceptable Pain Control/Comfort Level  Outcome: Ongoing (interventions implemented as appropriate)   03/01/19 1506   Pain, Acute (Adult)   Acceptable Pain Control/Comfort Level making progress toward outcome       Problem: Skin Injury Risk (Adult)  Goal: Identify Related Risk Factors and Signs and Symptoms  Outcome: Ongoing (interventions implemented as appropriate)   03/01/19 1506   Skin Injury Risk (Adult)   Related Risk Factors (Skin Injury Risk) infection;critical care admission     Goal: Skin Health and Integrity  Outcome: Ongoing (interventions  implemented as appropriate)

## 2019-03-01 NOTE — PAYOR COMM NOTE
"Olga Hansen (53 y.o. Female)     ATTN: NURSE REVIEWER  REF#[id number]  FAXING UPDATED CLINICALS ON PENDING INPT REVIEW. PLEASE REPLY TO KALEB SHERIDAN AT FAX#[phone] OR PHONE#[phone]. THANK YOU.       Date of Birth Social Security Number Address Home Phone MRN    1965  [id number] [facility] [address] [phone] [id number]    [employer] Marital Status          None        Admission Date Admission Type Admitting Provider Attending Provider Department, Room/Bed    2/26/19 Emergency Rosario Alaniz MD Kemparajurs, Plavakeerthi, MD [facility] MED SURG, 1414/1    Discharge Date Discharge Disposition Discharge Destination                       Attending Provider:  Rosario Alaniz MD    Allergies:  No Known Allergies    Isolation:  None   Infection:  None   Code Status:  CPR    Ht:  167.6 cm (66\")   Wt:  63 kg (138 lb 12.8 oz)    Admission Cmt:  None   Principal Problem:  None                Active Insurance as of 2/26/2019     Primary Coverage     Payor Plan Insurance Group Employer/Plan Group    [employer] [facility] [employer] [facility] [employer] EMPLOYEE [id number]     Payor Plan Address Payor Plan Phone Number Payor Plan Fax Number Effective Dates    [address] [phone]  1/1/2019 - None Entered    [place] [address]       Subscriber Name Subscriber Birth Date Member ID       OLGA HANSEN 1965 [id number]                 Emergency Contacts      (Rel.) Home Phone Work Phone Mobile Phone    Jesus Hansen (Spouse) [phone] -- [phone]            ICU Vital Signs     Row Name 03/01/19 1110 03/01/19 0800 03/01/19 0400 03/01/19 0200 03/01/19 0105       Vitals    Temp  98.5 °F (36.9 °C)  98.7 °F (37.1 °C)  99.3 °F (37.4 °C)  --  --    Temp src  Oral  Oral  Oral  --  --    Pulse  82  87  93  80  81    Heart Rate Source  Monitor  Monitor  --  --  --    Resp  18  20  --  --  --    Resp Rate Source  Visual  Visual  --  --  --    BP  " 114/72  116/68  127/64  136/72  90/77    Noninvasive MAP (mmHg)  --  83  84  100  83    BP Location  Left arm  Left arm  --  --  --    BP Method  Automatic  Automatic  --  --  --    Patient Position  Lying  Lying  --  --  --       Oxygen Therapy    SpO2  --  91 %  92 %  95 %  93 %    Pulse Oximetry Type  --  Continuous  --  --  --    Device (Oxygen Therapy)  room air  room air  --  --  --    Flow (L/min)  --  --  1  --  --    Row Name 03/01/19 0005 03/01/19 0000 02/28/19 2305 02/28/19 2205 02/28/19 2005       Vitals    Pulse  81  --  79  96  83    BP  120/59  --  107/61  108/67  118/68    Noninvasive MAP (mmHg)  88  --  77  85  85       Oxygen Therapy    SpO2  92 %  --  94 %  93 %  90 %    Flow (L/min)  --  1  --  --  --    Row Name 02/28/19 2000 02/28/19 1830 02/28/19 1802 02/28/19 1756 02/28/19 1741       Vitals    Pulse  --  99  96  94  90    Resp  --  --  22  --  --    Resp Rate Source  --  --  Visual  --  --    BP  --  --  109/61  --  --    Noninvasive MAP (mmHg)  --  --  79  --  --       Oxygen Therapy    SpO2  --  94 %  91 %  94 %  86 %  (Abnormal)     Pulse Oximetry Type  --  Continuous  Continuous  --  Continuous    Device (Oxygen Therapy)  nasal cannula  nasal cannula  nasal cannula  --  nasal cannula o2 tubing came off pt while sleeping; placed back on pt    Flow (L/min)  1  2 pt c/o SOA & requests to keep o2 on  2  --  2    Row Name 02/28/19 1732 02/28/19 1724 02/28/19 1702 02/28/19 1640 02/28/19 1636       Vitals    Pulse  91  91  93  --  --    BP  --  --  130/69  --  --    Noninvasive MAP (mmHg)  --  --  87  --  --       Oxygen Therapy    SpO2  90 %  93 %  92 %  82 %  (Abnormal)   --    Pulse Oximetry Type  Continuous  --  Continuous  Continuous  --    Device (Oxygen Therapy)  --  --  nasal cannula  room air pt sleeping and placed on 2 L at this time  room air    Flow (L/min)  --  --  2  --  --    Row Name 02/28/19 1602 02/28/19 1500                Vitals    Temp  --  98.3 °F (36.8 °C)       Temp src   --  Oral       Pulse  84  74       Heart Rate Source  Monitor  --       Resp  22  --       Resp Rate Source  Visual  --       BP  148/85  133/78       Noninvasive MAP (mmHg)  109  102       BP Location  Left arm  --       BP Method  Automatic  --       Patient Position  Lying  --          Oxygen Therapy    SpO2  90 %  96 %       Pulse Oximetry Type  Continuous  --       Device (Oxygen Therapy)  --  --       Flow (L/min)  --  --           Lines, Drains & Airways    Active LDAs     Name:   Placement date:   Placement time:   Site:   Days:    Peripheral IV 02/26/19 Left Wrist   02/26/19    --    Wrist   3    Peripheral IV 03/01/19 1240 Right Wrist   03/01/19    1240    Wrist   less than 1                Hospital Medications (active)       Dose Frequency Start End    acetaminophen (TYLENOL) tablet 650 mg 650 mg Every 4 Hours PRN 2/27/2019     Sig - Route: Take 2 tablets by mouth Every 4 (Four) Hours As Needed for Mild Pain , Headache or Fever. - Oral    cefTRIAXone (ROCEPHIN) IVPB 1 g/50ml dextrose (premix) 1 g Every 24 Hours 2/27/2019 3/6/2019    Sig - Route: Infuse 50 mL into a venous catheter Daily. - Intravenous    enoxaparin (LOVENOX) syringe 40 mg 40 mg Every 24 Hours 2/27/2019     Sig - Route: Inject 0.4 mL under the skin into the appropriate area as directed Daily. - Subcutaneous    gabapentin (NEURONTIN) capsule 800 mg 800 mg 3 Times Daily 2/27/2019     Sig - Route: Take 2 capsules by mouth 3 (Three) Times a Day. - Oral    ketorolac (TORADOL) injection 30 mg 30 mg Every 6 Hours PRN 2/28/2019 3/5/2019    Sig - Route: Infuse 1 mL into a venous catheter Every 6 (Six) Hours As Needed for Severe Pain . - Intravenous    ondansetron (ZOFRAN) injection 4 mg 4 mg Every 8 Hours PRN 2/27/2019     Sig - Route: Infuse 2 mL into a venous catheter Every 8 (Eight) Hours As Needed for Nausea or Vomiting. - Intravenous    pantoprazole (PROTONIX) EC tablet 40 mg 40 mg Every Early Morning 2/28/2019     Sig - Route: Take 1 tablet  by mouth Every Morning. - Oral    potassium chloride (K-DUR,KLOR-CON) CR tablet 40 mEq 40 mEq Once 3/1/2019 3/1/2019    Sig - Route: Take 2 tablets by mouth 1 (One) Time. - Oral    sodium chloride 0.9 % flush 10 mL 10 mL As Needed 2/26/2019     Sig - Route: Infuse 10 mL into a venous catheter As Needed for Line Care. - Intravenous    Cosign for Ordering: Accepted by Tay Romero MD on 2/26/2019 11:39 PM    traMADol (ULTRAM) tablet 50 mg 50 mg Every 6 Hours PRN 2/27/2019 3/9/2019    Sig - Route: Take 1 tablet by mouth Every 6 (Six) Hours As Needed for Moderate Pain . - Oral    sodium chloride 0.9 % infusion (Discontinued) 75 mL/hr Continuous 2/27/2019 3/1/2019    Sig - Route: Infuse 75 mL/hr into a venous catheter Continuous. - Intravenous          Blood Administration Record (From admission, onward)    None          Lab Results (last 24 hours)     Procedure Component Value Units Date/Time    Blood Culture - Blood, Arm, Right [[id number]]  (Abnormal)  (Susceptibility) Collected:  02/26/19 3722    Specimen:  Blood from Arm, Right Updated:  03/01/19 0623     Blood Culture Escherichia coli     Isolated from Anaerobic Bottle     Gram Stain Anaerobic Bottle Gram negative bacilli    Susceptibility      Escherichia coli     ARUN     Ampicillin Resistant     Ampicillin + Sulbactam Susceptible     Cefazolin Susceptible     Cefepime Susceptible     Cefoxitin Susceptible     Ceftriaxone Susceptible     Ertapenem Susceptible     Gentamicin Susceptible     Levofloxacin Susceptible     Meropenem Susceptible     Piperacillin + Tazobactam Susceptible     Trimethoprim + Sulfamethoxazole Resistant                    Comprehensive Metabolic Panel [[id number]]  (Abnormal) Collected:  03/01/19 0515    Specimen:  Blood Updated:  03/01/19 0545     Glucose 92 mg/dL      BUN 8 mg/dL      Creatinine 0.77 mg/dL      Sodium 139 mmol/L      Potassium 3.1 mmol/L      Chloride 109 mmol/L      CO2 19.2 mmol/L      Calcium 7.3 mg/dL      Total  Protein 5.2 g/dL      Albumin 2.40 g/dL      ALT (SGPT) 20 U/L      AST (SGOT) 24 U/L      Alkaline Phosphatase 169 U/L      Total Bilirubin 0.3 mg/dL      eGFR Non African Amer 78 mL/min/1.73      Globulin 2.8 gm/dL      A/G Ratio 0.9 g/dL      BUN/Creatinine Ratio 10.4     Anion Gap 10.8 mmol/L     CBC (No Diff) [[id number]]  (Abnormal) Collected:  03/01/19 0515    Specimen:  Blood Updated:  03/01/19 0521     WBC 4.96 10*3/mm3      RBC 3.54 10*6/mm3      Hemoglobin 10.6 g/dL      Hematocrit 32.4 %      MCV 91.5 fL      MCH 29.9 pg      MCHC 32.7 g/dL      RDW 13.4 %      RDW-SD 45.8 fl      MPV 10.7 fL      Platelets 119 10*3/mm3     Blood Culture - Blood, Arm, Right [[id number]] Collected:  02/27/19 0004    Specimen:  Blood from Arm, Right Updated:  03/01/19 0015     Blood Culture No growth at 2 days    Narrative:       Less than seven (7) mls of blood was collected.  Insufficient quantity may yield false negative results.        Imaging Results (last 24 hours)     Procedure Component Value Units Date/Time    XR Chest 2 View [[id number]] Collected:  03/01/19 0759     Updated:  03/01/19 0803    Narrative:       CHEST 2 VIEWS 3/1/2019     HISTORY:   Shortness of breath and cough today. Septic kidney infection. Fever and  chills since 2/23/2019.     FINDINGS:  The heart is normal in size. There is been interval development of  interstitial infiltrates bilaterally suggesting mild interstitial  pulmonary edema. Small bilateral pleural effusions, left greater than  right and there is airspace consolidation in the left lower lobe  characteristic of pneumonia. Minimal atelectasis or infiltrate right  base. No pneumothorax.       Impression:       1. Interval development of interstitial infiltrates seen diffusely  throughout both lungs probably representing mild pulmonary edema.  2. Small bilateral pleural effusions with infiltrate left lung base  suggesting pneumonia. Atelectasis or infiltrate right base.     This report  "was finalized on 3/1/2019 8:01 AM by Dr. Juan Jose Brownlee MD.                Physician Progress Notes (last 24 hours) (Notes from 2/28/2019  1:50 PM through 3/1/2019  1:50 PM)      Rosario Alaniz MD at 3/1/2019  7:23 AM          Daily Progress Note:      Chief complaint: Follow-up of sepsis, E. coli bacteremia, pyelonephritis    Subjective: Much improved.  Patient's back pain abdominal pain is improved.  She is eating better no vomiting she remains afebrile    Vital Signs  Temp:  [98.3 °F (36.8 °C)-99.3 °F (37.4 °C)] 99.3 °F (37.4 °C)  Heart Rate:  [72-99] 93  Resp:  [20-22] 22  BP: ()/(59-85) 127/64  Oxygen Therapy  SpO2: 92 %  Pulse Oximetry Type: Continuous  Device (Oxygen Therapy): nasal cannula  Flow (L/min): 1}  Body mass index is 22.4 kg/m².  Flowsheet Rows      First Filed Value   Admission Height  142.2 cm (56\") Documented at 02/26/2019 2323   Admission Weight  52.2 kg (115 lb) Documented at 02/26/2019 2323                   Documented weights    02/26/19 2323 02/27/19 0310 02/28/19 0520 02/28/19 0703   Weight: 52.2 kg (115 lb) 58.6 kg (129 lb 4 oz) 63.6 kg (140 lb 3.4 oz) 63 kg (138 lb 12.8 oz)           Patient Vitals for the past 24 hrs:   BP Temp Temp src Pulse Resp SpO2   03/01/19 0400 127/64 99.3 °F (37.4 °C) Oral 93 -- 92 %   03/01/19 0200 136/72 -- -- 80 -- 95 %   03/01/19 0105 90/77 -- -- 81 -- 93 %   03/01/19 0005 120/59 -- -- 81 -- 92 %   02/28/19 2305 107/61 -- -- 79 -- 94 %   02/28/19 2205 108/67 -- -- 96 -- 93 %   02/28/19 2005 118/68 -- -- 83 -- 90 %   02/28/19 1830 -- -- -- 99 -- 94 %   02/28/19 1802 109/61 -- -- 96 22 91 %   02/28/19 1756 -- -- -- 94 -- 94 %   02/28/19 1741 -- -- -- 90 -- (!) 86 %   02/28/19 1732 -- -- -- 91 -- 90 %   02/28/19 1724 -- -- -- 91 -- 93 %   02/28/19 1702 130/69 -- -- 93 -- 92 %   02/28/19 1640 -- -- -- -- -- (!) 82 %   02/28/19 1602 148/85 -- -- 84 22 90 %   02/28/19 1500 133/78 98.3 °F (36.8 °C) Oral 74 -- 96 %   02/28/19 1300 116/66 -- -- 78 -- " 95 %   02/28/19 1203 110/72 98.5 °F (36.9 °C) Oral 77 20 98 %   02/28/19 0952 -- -- -- 72 20 95 %       63 kg (138 lb 12.8 oz)      Intake/Output Summary (Last 24 hours) at 3/1/2019 0723  Last data filed at 2/28/2019 1827  Gross per 24 hour   Intake 970 ml   Output 900 ml   Net 70 ml       Review of Systems   Constitutional: Positive for fatigue. Negative for activity change and appetite change.   HENT: Negative for congestion.    Respiratory: Positive for cough and shortness of breath (Occasional shortness of breath). Negative for chest tightness and wheezing.    Cardiovascular: Negative for chest pain.   Gastrointestinal: Negative for abdominal distention, abdominal pain, diarrhea, nausea and vomiting.   Endocrine: Negative for polyphagia and polyuria.   Genitourinary: Negative for frequency.   Musculoskeletal: Positive for back pain.   Skin: Negative for rash.   Neurological: Negative for light-headedness.   Hematological: Does not bruise/bleed easily.   Psychiatric/Behavioral: Negative for agitation and behavioral problems. The patient is nervous/anxious.        Physical Exam   Constitutional: She appears well-developed and well-nourished. She appears ill.   HENT:   Head: Normocephalic.   Mouth/Throat: Oropharynx is clear and moist.   Eyes: Conjunctivae are normal.   Neck: Normal range of motion. No JVD present. No thyromegaly present.   Cardiovascular: Normal rate, regular rhythm and normal heart sounds.   No murmur heard.  Pulmonary/Chest: Effort normal. No respiratory distress. She has decreased breath sounds in the right lower field and the left lower field. She has no wheezes. She has no rales.   Abdominal: Soft. Bowel sounds are normal. She exhibits no distension. There is no tenderness. There is no guarding.   Neurological: She is alert.   Skin: Skin is warm and dry. No rash noted.   Nursing note and vitals reviewed.      Medication Review:   I have reviewed the patient's current medication  list  Scheduled Meds:  cefTRIAXone Sodium-Dextrose 1 g Intravenous Q24H   enoxaparin 40 mg Subcutaneous Q24H   gabapentin 800 mg Oral TID   pantoprazole 40 mg Oral Q AM   potassium chloride 40 mEq Oral Once     Continuous Infusions:   PRN Meds:.•  acetaminophen  •  ketorolac  •  ondansetron  •  sodium chloride  •  traMADol  @[email or web address]@      Labs:  Results from last 7 days   Lab Units 03/01/19 0515 02/28/19 0408 02/26/19 2356   WBC 10*3/mm3 4.96 5.88 11.82*   HEMOGLOBIN g/dL 10.6* 11.5* 13.7   HEMATOCRIT % 32.4* 36.2 42.9   PLATELETS 10*3/mm3 119* 126* 143     Results from last 7 days   Lab Units 03/01/19 0515 02/28/19 0408 02/26/19 2356   SODIUM mmol/L 139 144 131*   POTASSIUM mmol/L 3.1* 3.3* 3.5   CHLORIDE mmol/L 109* 111* 94*   CO2 mmol/L 19.2* 22.3 24.0   BUN mg/dL 8 8 10   CREATININE mg/dL 0.77 0.90 1.06*   CALCIUM mg/dL 7.3* 7.5* 8.0*   BILIRUBIN mg/dL 0.3  --  0.9   ALK PHOS U/L 169*  --  89   ALT (SGPT) U/L 20  --  16   AST (SGOT) U/L 24  --  19   GLUCOSE mg/dL 92 78 93           Lab Results (last 24 hours)     Procedure Component Value Units Date/Time    Blood Culture - Blood, Arm, Right [[id number]]  (Abnormal)  (Susceptibility) Collected:  02/26/19 2356    Specimen:  Blood from Arm, Right Updated:  03/01/19 0623     Blood Culture Escherichia coli     Isolated from Anaerobic Bottle     Gram Stain Anaerobic Bottle Gram negative bacilli    Susceptibility      Escherichia coli     ARUN     Ampicillin Resistant     Ampicillin + Sulbactam Susceptible     Cefazolin Susceptible     Cefepime Susceptible     Cefoxitin Susceptible     Ceftriaxone Susceptible     Ertapenem Susceptible     Gentamicin Susceptible     Levofloxacin Susceptible     Meropenem Susceptible     Piperacillin + Tazobactam Susceptible     Trimethoprim + Sulfamethoxazole Resistant                    Comprehensive Metabolic Panel [[id number]]  (Abnormal) Collected:  03/01/19 0515    Specimen:  Blood Updated:  03/01/19 0545     Glucose 92  mg/dL      BUN 8 mg/dL      Creatinine 0.77 mg/dL      Sodium 139 mmol/L      Potassium 3.1 mmol/L      Chloride 109 mmol/L      CO2 19.2 mmol/L      Calcium 7.3 mg/dL      Total Protein 5.2 g/dL      Albumin 2.40 g/dL      ALT (SGPT) 20 U/L      AST (SGOT) 24 U/L      Alkaline Phosphatase 169 U/L      Total Bilirubin 0.3 mg/dL      eGFR Non African Amer 78 mL/min/1.73      Globulin 2.8 gm/dL      A/G Ratio 0.9 g/dL      BUN/Creatinine Ratio 10.4     Anion Gap 10.8 mmol/L     CBC (No Diff) [[id number]]  (Abnormal) Collected:  03/01/19 0515    Specimen:  Blood Updated:  03/01/19 0521     WBC 4.96 10*3/mm3      RBC 3.54 10*6/mm3      Hemoglobin 10.6 g/dL      Hematocrit 32.4 %      MCV 91.5 fL      MCH 29.9 pg      MCHC 32.7 g/dL      RDW 13.4 %      RDW-SD 45.8 fl      MPV 10.7 fL      Platelets 119 10*3/mm3     Blood Culture - Blood, Arm, Right [[id number]] Collected:  02/27/19 0004    Specimen:  Blood from Arm, Right Updated:  03/01/19 0015     Blood Culture No growth at 2 days    Narrative:       Less than seven (7) mls of blood was collected.  Insufficient quantity may yield false negative results.                                  Invalid input(s): LDLCALC          No results found for: POCGLU  Results from last 7 days   Lab Units 02/27/19  0401 02/26/19  2356   LACTATE mmol/L 0.7 2.3*     Results from last 7 days   Lab Units 02/27/19  0004 02/26/19  2356   BLOODCX  No growth at 2 days Escherichia coli*   BCIDPCR   --  Escherichia coli. Identification by BCID PCR.*     Results from last 7 days   Lab Units 02/26/19  2359   NITRITE UA  Positive*   WBC UA /HPF 0-2*   BACTERIA UA /HPF 3+*   SQUAM EPITHEL UA /HPF 0-2             Radiology:  Imaging Results (last 24 hours)     ** No results found for the last 24 hours. **          Cardiology:  ECG/EMG Results (last 24 hours)     ** No results found for the last 24 hours. **          I have reviewed recent labs results and consult notes.  Parts of this note may have been  copied and pasted but patient was examined and interviewed by me today    Assessment and Plan:    1.  Sepsis has resolved her blood pressures are stable will DC IV fluids transfer patient to floor    2.  E. coli bacteremia we will continue with IV Rocephin changed to p.o. antibiotics likely tomorrow    3.  Severe back and flank pain likely due to her pyelonephritis versus underlying lumbar degenerative disc disease which she has had problems with the past this has been responsive to IV Toradol and tramadol and Tylenol    4.  Patient having reported periods of desaturation in the mid 80s at night.  I was never informed will get a desaturation study chest x-ray performed    5.  Hypokalemia p.o. potassium is been ordered    6.  DVT prophylaxis Lovenox and SCDs    7.  Generalized anxiety disorder gabapentin is being continued and she is still having some panic attacks we will continue to monitor      Electronically signed by Rosario Alaniz MD at 3/1/2019  7:30 AM

## 2019-03-01 NOTE — PLAN OF CARE
Problem: Patient Care Overview  Goal: Plan of Care Review  Outcome: Ongoing (interventions implemented as appropriate)   02/28/19 1848   Coping/Psychosocial   Plan of Care Reviewed With patient   Plan of Care Review   Progress improving   OTHER   Outcome Summary Pt stated she was feeling better most of the day. Around 1700, pt became very anxious, stating she was in a lot of pain and that she was having trouble breathing. RR was 22, breathing was labored, and pt was restless. o2 sats were 85-88% on room air, and pt was placed at 2L NC and pain med given. Pt seemed to settle down within about 30-45 minutes but continued to c/o feeling SOA and requested to keep o2 on, despite o2 sats mid 90s. I did decreased o2 from 2 to 1 L at that time. VSS.       Problem: Infection, Risk/Actual (Adult)  Goal: Identify Related Risk Factors and Signs and Symptoms  Outcome: Ongoing (interventions implemented as appropriate)   02/28/19 1848   Infection, Risk/Actual (Adult)   Related Risk Factors (Infection, Risk/Actual) sleep disturbance;treatment plan   Signs and Symptoms (Infection, Risk/Actual) pain;weakness       Problem: Pain, Acute (Adult)  Goal: Identify Related Risk Factors and Signs and Symptoms  Outcome: Ongoing (interventions implemented as appropriate)   02/28/19 1848   Pain, Acute (Adult)   Related Risk Factors (Acute Pain) infection;persistent pain;patient perception   Signs and Symptoms (Acute Pain) verbalization of pain descriptors;anorexia;fatigue/weakness       Problem: Skin Injury Risk (Adult)  Goal: Identify Related Risk Factors and Signs and Symptoms  Outcome: Ongoing (interventions implemented as appropriate)   02/28/19 1848   Skin Injury Risk (Adult)   Related Risk Factors (Skin Injury Risk) infection;nutritional deficiencies;critical care admission

## 2019-03-02 VITALS
WEIGHT: 138.8 LBS | HEIGHT: 66 IN | DIASTOLIC BLOOD PRESSURE: 72 MMHG | RESPIRATION RATE: 18 BRPM | OXYGEN SATURATION: 97 % | TEMPERATURE: 97.3 F | BODY MASS INDEX: 22.31 KG/M2 | HEART RATE: 79 BPM | SYSTOLIC BLOOD PRESSURE: 125 MMHG

## 2019-03-02 LAB
ANION GAP SERPL CALCULATED.3IONS-SCNC: 11.8 MMOL/L
BUN BLD-MCNC: 7 MG/DL (ref 6–20)
BUN/CREAT SERPL: 10.9 (ref 7–25)
CALCIUM SPEC-SCNC: 7.7 MG/DL (ref 8.6–10.5)
CHLORIDE SERPL-SCNC: 105 MMOL/L (ref 98–107)
CO2 SERPL-SCNC: 22.2 MMOL/L (ref 22–29)
CREAT BLD-MCNC: 0.64 MG/DL (ref 0.57–1)
GFR SERPL CREATININE-BSD FRML MDRD: 96 ML/MIN/1.73
GLUCOSE BLD-MCNC: 81 MG/DL (ref 65–99)
POTASSIUM BLD-SCNC: 4 MMOL/L (ref 3.5–5.2)
SODIUM BLD-SCNC: 139 MMOL/L (ref 136–145)

## 2019-03-02 PROCEDURE — 80048 BASIC METABOLIC PNL TOTAL CA: CPT | Performed by: FAMILY MEDICINE

## 2019-03-02 PROCEDURE — 99239 HOSP IP/OBS DSCHRG MGMT >30: CPT | Performed by: INTERNAL MEDICINE

## 2019-03-02 PROCEDURE — 25010000002 KETOROLAC TROMETHAMINE PER 15 MG: Performed by: FAMILY MEDICINE

## 2019-03-02 PROCEDURE — 25010000002 ENOXAPARIN PER 10 MG: Performed by: FAMILY MEDICINE

## 2019-03-02 RX ORDER — CEFDINIR 300 MG/1
300 CAPSULE ORAL 2 TIMES DAILY
Qty: 20 CAPSULE | Refills: 0 | Status: SHIPPED | OUTPATIENT
Start: 2019-03-02 | End: 2019-03-12

## 2019-03-02 RX ORDER — KETOPROFEN 50 MG/1
50 CAPSULE ORAL 4 TIMES DAILY PRN
Qty: 20 CAPSULE | Refills: 0 | Status: SHIPPED | OUTPATIENT
Start: 2019-03-02 | End: 2019-03-07

## 2019-03-02 RX ADMIN — TRAMADOL HYDROCHLORIDE 50 MG: 50 TABLET, FILM COATED ORAL at 15:03

## 2019-03-02 RX ADMIN — TRAMADOL HYDROCHLORIDE 50 MG: 50 TABLET, FILM COATED ORAL at 06:36

## 2019-03-02 RX ADMIN — ACETAMINOPHEN 650 MG: 325 TABLET, FILM COATED ORAL at 00:00

## 2019-03-02 RX ADMIN — GABAPENTIN 800 MG: 400 CAPSULE ORAL at 09:01

## 2019-03-02 RX ADMIN — ACETAMINOPHEN 650 MG: 325 TABLET, FILM COATED ORAL at 15:02

## 2019-03-02 RX ADMIN — KETOROLAC TROMETHAMINE 30 MG: 30 INJECTION, SOLUTION INTRAMUSCULAR at 03:42

## 2019-03-02 RX ADMIN — KETOROLAC TROMETHAMINE 30 MG: 30 INJECTION, SOLUTION INTRAMUSCULAR at 10:35

## 2019-03-02 RX ADMIN — PANTOPRAZOLE SODIUM 40 MG: 40 TABLET, DELAYED RELEASE ORAL at 06:36

## 2019-03-02 RX ADMIN — ENOXAPARIN SODIUM 40 MG: 40 INJECTION SUBCUTANEOUS at 09:01

## 2019-03-02 RX ADMIN — ACETAMINOPHEN 650 MG: 325 TABLET, FILM COATED ORAL at 06:36

## 2019-03-02 NOTE — PLAN OF CARE
Problem: Patient Care Overview  Goal: Plan of Care Review  Outcome: Ongoing (interventions implemented as appropriate)   03/02/19 0429   Coping/Psychosocial   Plan of Care Reviewed With patient   Plan of Care Review   Progress no change   OTHER   Outcome Summary Pt c/o constant upper back pain/burning - pain is somewhat controlled with tylenol, toradol and tramadol - pt seems restless and takes naps, no real sleep - no overnight events - will continue to monitor     Goal: Individualization and Mutuality  Outcome: Ongoing (interventions implemented as appropriate)      Problem: Infection, Risk/Actual (Adult)  Goal: Identify Related Risk Factors and Signs and Symptoms  Outcome: Ongoing (interventions implemented as appropriate)    Goal: Infection Prevention/Resolution  Outcome: Ongoing (interventions implemented as appropriate)      Problem: Pain, Acute (Adult)  Goal: Identify Related Risk Factors and Signs and Symptoms  Outcome: Ongoing (interventions implemented as appropriate)    Goal: Acceptable Pain Control/Comfort Level  Outcome: Ongoing (interventions implemented as appropriate)      Problem: Skin Injury Risk (Adult)  Goal: Identify Related Risk Factors and Signs and Symptoms  Outcome: Ongoing (interventions implemented as appropriate)    Goal: Skin Health and Integrity  Outcome: Ongoing (interventions implemented as appropriate)

## 2019-03-02 NOTE — DISCHARGE SUMMARY
Olga Hansen  1965  [id number]    Hospitalists Discharge Summary    Date of Admission: 2/26/2019  Date of Discharge:  3/2/2019    History of Present Illness:    53-year-old white female who presented to emergency with a 3-day history of right-sided flank pain.  Started about 3 days ago with right flank pain and then started to have both sides of her upper back.  She then started having lower abdominal pain with some frequency of urination he denies any dysuria started having fever with some chills.  Is been nausea but no vomiting.  She did not check her temperature at home.  She denies any diarrhea.  She has some generalized malaise since the onset of flank pain but no myalgias.          Hospital Course Summary/Primary Discharge Diagnoses:     Acute pyelonephritis with Escherichia coli septicemia    Patient was admitted in the hospital found to have pulmonary nephritis which was evident on CT scan of the abdomen as well as positive blood culture and patient was given IV antibiotics Rocephin and Escherichia coli culture came out to be susceptible to Rocephin and patient being discharged home on by mouth Omnicef.     Secondary Discharge Diagnoses:    Anxiety disorder  COPD  Smoking    PCP  Patient Care Team:  Rosario Alaniz MD as PCP - General (Family Medicine)    Consults:   Consults     No orders found from 1/28/2019 to 2/27/2019.          Operations and Procedures Performed:       Xr Chest 2 View    Result Date: 3/1/2019  Narrative: CHEST 2 VIEWS 3/1/2019  HISTORY: Shortness of breath and cough today. Septic kidney infection. Fever and chills since 2/23/2019.  FINDINGS: The heart is normal in size. There is been interval development of interstitial infiltrates bilaterally suggesting mild interstitial pulmonary edema. Small bilateral pleural effusions, left greater than right and there is airspace consolidation in the left lower lobe characteristic of pneumonia. Minimal atelectasis or infiltrate  right base. No pneumothorax.      Impression: 1. Interval development of interstitial infiltrates seen diffusely throughout both lungs probably representing mild pulmonary edema. 2. Small bilateral pleural effusions with infiltrate left lung base suggesting pneumonia. Atelectasis or infiltrate right base.  This report was finalized on 3/1/2019 8:01 AM by Dr. Juan Jose Brownlee MD.      Xr Chest 2 View    Result Date: 2/27/2019  Narrative: CHEST 2 VIEWS 2/27/2019  HISTORY: Fever cough bodyaches and right flank pain for 3 days.  FINDINGS: The heart is normal in size. The lungs are clear. There are no pleural effusions.      Impression: Negative chest.  This report was finalized on 2/27/2019 7:43 AM by Dr. Juan Jose Brownlee MD.      Ct Abdomen Pelvis With Contrast    Result Date: 2/27/2019  Narrative: CT SCAN OF THE ABDOMEN AND PELVIS WITH CONTRAST 2/27/2019  HISTORY: Bilateral flank pain radiating into the back and throughout the entire abdomen for the past 4 days, worsening. Sepsis. Pyelonephritis. Fever and chills with increased frequency of urination.  TECHNIQUE: Spiral CT was performed through the abdomen and pelvis following intravenous contrast administration only as per clinician request. Radiation dose reduction techniques included automated exposure control or exposure modulation based on body size. Radiation audit for CT and nuclear cardiology exams in the last 12 months: 0.  ABDOMEN FINDINGS: The exam is somewhat limited by the lack of oral contrast. There are multiple ill-defined areas of decreased renal cortical enhancement bilaterally characteristic of pyelonephritis. There is also some inflammatory stranding in the bilateral perinephric fat. There is no evidence of hydronephrosis. No cystic or solid mass lesions are seen in either kidney. There is mild heterogeneous fatty infiltration of the liver. There is decreased density in the periportal region suggesting periportal edema of unknown etiology. Spleen,  pancreas, and adrenal glands are normal. The gallbladder is mildly distended but otherwise unremarkable.  PELVIS FINDINGS: The gut, mesenteric and jarad structures are normal. There is no free fluid in the abdomen or pelvis. The appendix is normal. Atelectasis or minimal infiltrates seen in the posterior aspect of the lower lobes bilaterally. There is a small hiatal hernia.      Impression: 1. Multiple ill-defined areas of decreased renal cortical enhancement bilaterally characteristic of pyelonephritis. Inflammatory stranding is seen in the bilateral perinephric fat. Correlation with clinical and laboratory findings is recommended. 2. Heterogeneous fatty infiltration of the liver. 3. Decreased density in the periportal region suggesting periportal edema of unknown etiology. 4. Atelectasis or minimal infiltrates in the posterior aspect of the lower lobes bilaterally, right greater than left. 5. Small hiatal hernia.  This report was finalized on 2/27/2019 10:20 AM by Dr. Juan Jose Brownlee MD.        Allergies:  has No Known Allergies.    Juan  reviewed    Discharge Medications:     Discharge Medications      New Medications      Instructions Start Date   cefdinir 300 MG capsule  Commonly known as:  OMNICEF   300 mg, Oral, 2 Times Daily      ketoprofen 50 MG capsule  Commonly known as:  ORUDIS   50 mg, Oral, 4 Times Daily PRN         Continue These Medications      Instructions Start Date   amphetamine-dextroamphetamine 10 MG tablet  Commonly known as:  ADDERALL   10 mg, Oral, Daily      gabapentin 800 MG tablet  Commonly known as:  NEURONTIN   800 mg, Oral, 3 Times Daily      VYVANSE 10 MG capsule  Generic drug:  lisdexamfetamine dimesylate   10 mg, Oral, Daily             Last Lab Results:   Lab Results (most recent)     Procedure Component Value Units Date/Time    Basic Metabolic Panel [[id number]]  (Abnormal) Collected:  03/02/19 0441    Specimen:  Blood Updated:  03/02/19 0700     Glucose 81 mg/dL      BUN 7 mg/dL       Creatinine 0.64 mg/dL      Sodium 139 mmol/L      Potassium 4.0 mmol/L      Chloride 105 mmol/L      CO2 22.2 mmol/L      Calcium 7.7 mg/dL      eGFR Non African Amer 96 mL/min/1.73      BUN/Creatinine Ratio 10.9     Anion Gap 11.8 mmol/L     Narrative:       GFR Normal >60  Chronic Kidney Disease <60  Kidney Failure <15    Blood Culture - Blood, Arm, Right [[id number]] Collected:  02/27/19 0004    Specimen:  Blood from Arm, Right Updated:  03/02/19 0015     Blood Culture No growth at 3 days    Narrative:       Less than seven (7) mls of blood was collected.  Insufficient quantity may yield false negative results.    Blood Culture - Blood, Arm, Right [[id number]]  (Abnormal)  (Susceptibility) Collected:  02/26/19 2356    Specimen:  Blood from Arm, Right Updated:  03/01/19 0623     Blood Culture Escherichia coli     Isolated from Anaerobic Bottle     Gram Stain Anaerobic Bottle Gram negative bacilli    Susceptibility      Escherichia coli     ARUN     Ampicillin Resistant     Ampicillin + Sulbactam Susceptible     Cefazolin Susceptible     Cefepime Susceptible     Cefoxitin Susceptible     Ceftriaxone Susceptible     Ertapenem Susceptible     Gentamicin Susceptible     Levofloxacin Susceptible     Meropenem Susceptible     Piperacillin + Tazobactam Susceptible     Trimethoprim + Sulfamethoxazole Resistant                    Comprehensive Metabolic Panel [[id number]]  (Abnormal) Collected:  03/01/19 0515    Specimen:  Blood Updated:  03/01/19 0545     Glucose 92 mg/dL      BUN 8 mg/dL      Creatinine 0.77 mg/dL      Sodium 139 mmol/L      Potassium 3.1 mmol/L      Chloride 109 mmol/L      CO2 19.2 mmol/L      Calcium 7.3 mg/dL      Total Protein 5.2 g/dL      Albumin 2.40 g/dL      ALT (SGPT) 20 U/L      AST (SGOT) 24 U/L      Alkaline Phosphatase 169 U/L      Total Bilirubin 0.3 mg/dL      eGFR Non African Amer 78 mL/min/1.73      Globulin 2.8 gm/dL      A/G Ratio 0.9 g/dL      BUN/Creatinine Ratio 10.4     Anion Gap  10.8 mmol/L     CBC (No Diff) [[id number]]  (Abnormal) Collected:  03/01/19 0515    Specimen:  Blood Updated:  03/01/19 0521     WBC 4.96 10*3/mm3      RBC 3.54 10*6/mm3      Hemoglobin 10.6 g/dL      Hematocrit 32.4 %      MCV 91.5 fL      MCH 29.9 pg      MCHC 32.7 g/dL      RDW 13.4 %      RDW-SD 45.8 fl      MPV 10.7 fL      Platelets 119 10*3/mm3     Basic Metabolic Panel [[id number]]  (Abnormal) Collected:  02/28/19 0408    Specimen:  Blood Updated:  02/28/19 0531     Glucose 78 mg/dL      BUN 8 mg/dL      Creatinine 0.90 mg/dL      Sodium 144 mmol/L      Potassium 3.3 mmol/L      Chloride 111 mmol/L      CO2 22.3 mmol/L      Calcium 7.5 mg/dL      eGFR Non African Amer 65 mL/min/1.73      BUN/Creatinine Ratio 8.9     Anion Gap 10.7 mmol/L     Narrative:       GFR Normal >60  Chronic Kidney Disease <60  Kidney Failure <15    CBC (No Diff) [[id number]]  (Abnormal) Collected:  02/28/19 0408    Specimen:  Blood Updated:  02/28/19 0514     WBC 5.88 10*3/mm3      RBC 3.85 10*6/mm3      Hemoglobin 11.5 g/dL      Hematocrit 36.2 %      MCV 94.0 fL      MCH 29.9 pg      MCHC 31.8 g/dL      RDW 13.3 %      RDW-SD 45.9 fl      MPV 12.1 fL      Platelets 126 10*3/mm3     Blood Culture ID, PCR - Blood, Arm, Right [[id number]]  (Abnormal) Collected:  02/26/19 2356    Specimen:  Blood from Arm, Right Updated:  02/27/19 2111     BCID, PCR Escherichia coli. Identification by BCID PCR.    Lactic Acid, Reflex [[id number]]  (Normal) Collected:  02/27/19 0401    Specimen:  Blood Updated:  02/27/19 0421     Lactate 0.7 mmol/L     Lactic Acid, Reflex Timer (This will reflex a repeat order 3-3:15 hours after ordered.) [[id number]] Collected:  02/26/19 2356    Specimen:  Blood Updated:  02/27/19 0330     Extra Tube Hold for add-ons.     Comment: Auto resulted.       [place] Draw [[id number]] Collected:  02/26/19 5220    Specimen:  Blood Updated:  02/27/19 0130    Narrative:       The following orders were created for panel order [place]  Draw.  Procedure                               Abnormality         Status                     ---------                               -----------         ------                     Light Blue Top[[id number]]                                   Final result               Green Top (Gel)[[id number]]                                  Final result               Lavender Top[[id number]]                                     Final result               Gold Top - SST[[id number]]                                   Final result                 Please view results for these tests on the individual orders.    Lavender Top [[id number]] Collected:  02/26/19 2356    Specimen:  Blood Updated:  02/27/19 0130     Extra Tube hold for add-on     Comment: Auto resulted       CBC & Differential [[id number]] Collected:  02/26/19 2356    Specimen:  Blood Updated:  02/27/19 0128    Narrative:       The following orders were created for panel order CBC & Differential.  Procedure                               Abnormality         Status                     ---------                               -----------         ------                     CBC Auto Differential[[id number]]        Abnormal            Final result                 Please view results for these tests on the individual orders.    CBC Auto Differential [[id number]]  (Abnormal) Collected:  02/26/19 2356    Specimen:  Blood Updated:  02/27/19 0128     WBC 11.82 10*3/mm3      RBC 4.53 10*6/mm3      Hemoglobin 13.7 g/dL      Hematocrit 42.9 %      MCV 94.7 fL      MCH 30.2 pg      MCHC 31.9 g/dL      RDW 13.0 %      RDW-SD 45.1 fl      MPV 11.7 fL      Platelets 143 10*3/mm3      Neutrophil % 85.3 %      Lymphocyte % 5.0 %      Monocyte % 8.3 %      Eosinophil % 0.3 %      Basophil % 0.3 %      Immature Grans % 0.8 %      Neutrophils, Absolute 10.09 10*3/mm3      Lymphocytes, Absolute 0.59 10*3/mm3      Monocytes, Absolute 0.98 10*3/mm3      Eosinophils, Absolute 0.03 10*3/mm3      Basophils,  Absolute 0.03 10*3/mm3      Immature Grans, Absolute 0.10 10*3/mm3      nRBC 0.0 /100 WBC     Influenza Antigen, Rapid - Swab, Nasopharynx [[id number]]  (Normal) Collected:  02/27/19 0005    Specimen:  Swab from Nasopharynx Updated:  02/27/19 0112     Influenza A Ag, EIA Negative     Influenza B Ag, EIA Negative    Light Blue Top [[id number]] Collected:  02/26/19 2356    Specimen:  Blood Updated:  02/27/19 0100     Extra Tube hold for add-on     Comment: Auto resulted       Green Top (Gel) [[id number]] Collected:  02/26/19 2356    Specimen:  Blood Updated:  02/27/19 0100     Extra Tube Hold for add-ons.     Comment: Auto resulted.       Gold Top - SST [[id number]] Collected:  02/26/19 2356    Specimen:  Blood Updated:  02/27/19 0100     Extra Tube Hold for add-ons.     Comment: Auto resulted.       Urinalysis, Microscopic Only - Urine, Clean Catch [[id number]]  (Abnormal) Collected:  02/26/19 2359    Specimen:  Urine, Clean Catch Updated:  02/27/19 0030     RBC, UA None Seen /HPF      WBC, UA 0-2 /HPF      Bacteria, UA 3+ /HPF      Squamous Epithelial Cells, UA 0-2 /HPF      Hyaline Casts, UA None Seen /LPF      Methodology Manual Light Microscopy    Lactic Acid, Plasma [[id number]]  (Abnormal) Collected:  02/26/19 2356    Specimen:  Blood Updated:  02/27/19 0027     Lactate 2.3 mmol/L     Comprehensive Metabolic Panel [[id number]]  (Abnormal) Collected:  02/26/19 2356    Specimen:  Blood Updated:  02/27/19 0024     Glucose 93 mg/dL      BUN 10 mg/dL      Creatinine 1.06 mg/dL      Sodium 131 mmol/L      Potassium 3.5 mmol/L      Chloride 94 mmol/L      CO2 24.0 mmol/L      Calcium 8.0 mg/dL      Total Protein 6.7 g/dL      Albumin 3.20 g/dL      ALT (SGPT) 16 U/L      AST (SGOT) 19 U/L      Alkaline Phosphatase 89 U/L      Total Bilirubin 0.9 mg/dL      eGFR Non African Amer 54 mL/min/1.73      Globulin 3.5 gm/dL      A/G Ratio 0.9 g/dL      BUN/Creatinine Ratio 9.4     Anion Gap 13.0 mmol/L     Urinalysis With Culture If  Indicated - Urine, Clean Catch [[id number]]  (Abnormal) Collected:  02/26/19 2359    Specimen:  Urine, Clean Catch Updated:  02/27/19 0009     Color, UA Yellow     Appearance, UA Cloudy     pH, UA 6.0     Specific Gravity, UA 1.020     Glucose, UA Negative     Ketones, UA Negative     Bilirubin, UA Negative     Blood, UA Moderate (2+)     Protein,  mg/dL (2+)     Leuk Esterase, UA Moderate (2+)     Nitrite, UA Positive     Urobilinogen, UA >=8.0 E.U./dL        Imaging Results (most recent)     Procedure Component Value Units Date/Time    XR Chest 2 View [[id number]] Collected:  03/01/19 0759     Updated:  03/01/19 0803    Narrative:       CHEST 2 VIEWS 3/1/2019     HISTORY:   Shortness of breath and cough today. Septic kidney infection. Fever and  chills since 2/23/2019.     FINDINGS:  The heart is normal in size. There is been interval development of  interstitial infiltrates bilaterally suggesting mild interstitial  pulmonary edema. Small bilateral pleural effusions, left greater than  right and there is airspace consolidation in the left lower lobe  characteristic of pneumonia. Minimal atelectasis or infiltrate right  base. No pneumothorax.       Impression:       1. Interval development of interstitial infiltrates seen diffusely  throughout both lungs probably representing mild pulmonary edema.  2. Small bilateral pleural effusions with infiltrate left lung base  suggesting pneumonia. Atelectasis or infiltrate right base.     This report was finalized on 3/1/2019 8:01 AM by Dr. Juan Jose Brownlee MD.       CT Abdomen Pelvis With Contrast [[id number]] Collected:  02/27/19 1012     Updated:  02/27/19 1023    Narrative:       CT SCAN OF THE ABDOMEN AND PELVIS WITH CONTRAST 2/27/2019     HISTORY:  Bilateral flank pain radiating into the back and throughout the entire  abdomen for the past 4 days, worsening. Sepsis. Pyelonephritis. Fever  and chills with increased frequency of urination.     TECHNIQUE:  Spiral CT was  performed through the abdomen and pelvis following  intravenous contrast administration only as per clinician request.  Radiation dose reduction techniques included automated exposure control  or exposure modulation based on body size. Radiation audit for CT and  nuclear cardiology exams in the last 12 months: 0.     ABDOMEN FINDINGS:  The exam is somewhat limited by the lack of oral contrast. There are  multiple ill-defined areas of decreased renal cortical enhancement  bilaterally characteristic of pyelonephritis. There is also some  inflammatory stranding in the bilateral perinephric fat. There is no  evidence of hydronephrosis. No cystic or solid mass lesions are seen in  either kidney. There is mild heterogeneous fatty infiltration of the  liver. There is decreased density in the periportal region suggesting  periportal edema of unknown etiology. Spleen, pancreas, and adrenal  glands are normal. The gallbladder is mildly distended but otherwise  unremarkable.     PELVIS FINDINGS:  The gut, mesenteric and jarad structures are normal. There is no free  fluid in the abdomen or pelvis. The appendix is normal. Atelectasis or  minimal infiltrates seen in the posterior aspect of the lower lobes  bilaterally. There is a small hiatal hernia.       Impression:       1. Multiple ill-defined areas of decreased renal cortical enhancement  bilaterally characteristic of pyelonephritis. Inflammatory stranding is  seen in the bilateral perinephric fat. Correlation with clinical and  laboratory findings is recommended.  2. Heterogeneous fatty infiltration of the liver.  3. Decreased density in the periportal region suggesting periportal  edema of unknown etiology.  4. Atelectasis or minimal infiltrates in the posterior aspect of the  lower lobes bilaterally, right greater than left.  5. Small hiatal hernia.     This report was finalized on 2/27/2019 10:20 AM by Dr. Juan Jose Brownlee MD.       XR Chest 2 View [[id number]] Collected:   02/27/19 0742     Updated:  02/27/19 0745    Narrative:       CHEST 2 VIEWS 2/27/2019     HISTORY:   Fever cough bodyaches and right flank pain for 3 days.     FINDINGS:  The heart is normal in size. The lungs are clear. There are no pleural  effusions.       Impression:       Negative chest.     This report was finalized on 2/27/2019 7:43 AM by Dr. Juan Jose Brownlee MD.             PROCEDURES      Condition on Discharge:  Stable    Physical Exam at Discharge  Vital Signs  Temp:  [97.3 °F (36.3 °C)-98.2 °F (36.8 °C)] 97.3 °F (36.3 °C)  Heart Rate:  [79-85] 79  Resp:  [16-18] 18  BP: (122-146)/(68-79) 125/72    Physical Exam:  Physical Exam   Constitutional: Patient appears well-developed and well-nourished and in no acute distress   HEENT:   Head: Normocephalic and atraumatic.   Eyes:  Pupils are equal, round, and reactive to light. EOM are intact. Sclera are anicteric and non-injected.  Mouth and Throat: Patient has moist mucous membranes. Oropharynx is clear of any erythema or exudate.     Neck: Neck supple. No JVD present. No thyromegaly present. No lymphadenopathy present.  Cardiovascular: Regular rate, regular rhythm, S1 normal and S2 normal.  Exam reveals no gallop and no friction rub.  No murmur heard.  Pulmonary/Chest: Lungs are clear to auscultation bilaterally. No respiratory distress. No wheezes. No rhonchi. No rales.   Abdominal: Soft. Bowel sounds are normal. No distension and no mass. There is no hepatosplenomegaly. There is no tenderness.   Musculoskeletal: Normal Muscle tone  Extremities: No edema. Pulses are palpable in all 4 extremities.  Neurological: Patient is alert and oriented to person, place, and time. Cranial nerves II-XII are grossly intact with no focal deficits.  Skin: Skin is warm. No rash noted. Nails show no clubbing.  No cyanosis or erythema.    Discharge Disposition  Home    Visiting Nurse:    No     Home PT/OT:  No     Home Safety Evaluation:  No     DME  None     Discharge Diet:       Dietary Orders (From admission, onward)    Start     Ordered    02/27/19 0338  Diet Regular  Diet Effective Now     Question:  Diet Texture / Consistency  Answer:  Regular    02/27/19 0338          Activity at Discharge:  As tolerated    Pre-discharge education  Smoking      Follow-up Appointments  No future appointments.      Test Results Pending at Discharge   Order Current Status    Blood Culture - Blood, Arm, Right Preliminary result           Joey King MD  03/02/19  2:00 PM    Time: Discharge 32 min which included seeing the patient reviewing the chart as well as documenting discharge summary and going order sets for discharge.

## 2019-03-03 ENCOUNTER — READMISSION MANAGEMENT (OUTPATIENT)
Dept: CALL CENTER | Facility: HOSPITAL | Age: 54
End: 2019-03-03

## 2019-03-04 ENCOUNTER — READMISSION MANAGEMENT (OUTPATIENT)
Dept: CALL CENTER | Facility: HOSPITAL | Age: 54
End: 2019-03-04

## 2019-03-04 ENCOUNTER — NURSE TRIAGE (OUTPATIENT)
Dept: CALL CENTER | Facility: HOSPITAL | Age: 54
End: 2019-03-04

## 2019-03-04 LAB — BACTERIA SPEC AEROBE CULT: NORMAL

## 2019-03-04 NOTE — TELEPHONE ENCOUNTER
"Caller asking if she can get 4 pills to until she can get prescription from [facility]. Email sent to [facility] Case Management for this caller. Caller advised if she doesn't hear from them this afternoon, she should go to ED for treatment of her pain. Caller reluctant to do this.     Reason for Disposition  • Caller has medication question, adult has minor symptoms, caller declines triage, and triager answers question    Additional Information  • Negative: Drug overdose and nurse unable to answer question  • Negative: Caller requesting information not related to medicine  • Negative: Caller requesting a prescription for Strep throat and has a positive culture result  • Negative: Rash while taking a medication or within 3 days of stopping it  • Negative: Immunization reaction suspected  • Negative: [1] Asthma and [2] having symptoms of asthma (cough, wheezing, etc)  • Negative: MORE THAN A DOUBLE DOSE of a prescription or over-the-counter (OTC) drug  • Negative: [1] DOUBLE DOSE (an extra dose or lesser amount) of over-the-counter (OTC) drug AND [2] any symptoms (e.g., dizziness, nausea, pain, sleepiness)  • Negative: [1] DOUBLE DOSE (an extra dose or lesser amount) of prescription drug AND [2] any symptoms (e.g., dizziness, nausea, pain, sleepiness)  • Negative: Took another person's prescription drug  • Negative: [1] DOUBLE DOSE (an extra dose or lesser amount) of prescription drug AND [2] NO symptoms (Exception: a double dose of antibiotics)  • Negative: Diabetes drug error or overdose (e.g., insulin or extra dose)  • Negative: [1] Request for URGENT new prescription or refill of \"essential\" medication (i.e., likelihood of harm to patient if not taken) AND [2] triager unable to fill per unit policy  • Negative: [1] Prescription not at pharmacy AND [2] was prescribed today by PCP  • Negative: Pharmacy calling with prescription questions and triager unable to answer question  • Negative: Caller has URGENT medication " "question about med that PCP prescribed and triager unable to answer question  • Negative: Caller has NON-URGENT medication question about med that PCP prescribed and triager unable to answer question  • Negative: Caller requesting a NON-URGENT new prescription or refill and triager unable to refill per unit policy  • Negative: Caller has medication question about med not prescribed by PCP and triager unable to answer question (e.g., compatibility with other med, storage)  • Negative: [1] DOUBLE DOSE (an extra dose or lesser amount) of over-the-counter (OTC) drug AND [2] NO symptoms  • Negative: [1] DOUBLE DOSE (an extra dose or lesser amount) of antibiotic drug AND [2] NO symptoms  • Negative: Caller has medication question only, adult not sick, and triager answers question    Answer Assessment - Initial Assessment Questions  1. SYMPTOMS: \"Do you have any symptoms?\"      Back pain  2. SEVERITY: If symptoms are present, ask \"Are they mild, moderate or severe?\"      mild    Protocols used: MEDICATION QUESTION CALL-ADULT-      "

## 2019-03-04 NOTE — PAYOR COMM NOTE
"Olga Cain (53 y.o. Female)     ATTN: NURSE REVIEWER  REF#[id number]  FAXING DISCHARGE INFO ON PENDING INPT REVIEW. PLEASE REPLY TO KALEB SHERIDAN AT FAX#[phone] OR PHONE#[phone]. AWAITING REPLY.       Date of Birth Social Security Number Address Home Phone MRN    1965  [address]  [place] [facility] [id number] [phone] [id number]    [employer] Marital Status          None        Admission Date Admission Type Admitting Provider Attending Provider Department, Room/Bed    2/26/19 Emergency Rosario Alaniz MD  [facility] MED SURG, 1414/1    Discharge Date Discharge Disposition Discharge Destination        3/2/2019 Home or Self Care              Attending Provider:  (none)   Allergies:  No Known Allergies    Isolation:  None   Infection:  None   Code Status:  Prior    Ht:  167.6 cm (66\")   Wt:  63 kg (138 lb 12.8 oz)    Admission Cmt:  None   Principal Problem:  None                Active Insurance as of 2/26/2019     Primary Coverage     Payor Plan Insurance Group Employer/Plan Group    [employer] EM[employer] [id number]     Payor Plan Address Payor Plan Phone Number Payor Plan Fax Number Effective Dates    [address] [phone]  1/1/2019 - None Entered    [place] [id number]       Subscriber Name Subscriber Birth Date Member ID       OLGA CAIN 1965 [id number]                 Emergency Contacts      (Rel.) Home Phone Work Phone Mobile Phone    Jesus Cain (Spouse) [phone] -- [phone]               Discharge Summary      Joey King MD at 3/2/2019  2:00 PM          Olga Cain  1965  [id number]    Hospitalists Discharge Summary    Date of Admission: 2/26/2019  Date of Discharge:  3/2/2019    History of Present Illness:    53-year-old white female who presented to emergency with a 3-day history of right-sided flank pain.  Started about 3 days ago with right flank pain and then started to " have both sides of her upper back.  She then started having lower abdominal pain with some frequency of urination he denies any dysuria started having fever with some chills.  Is been nausea but no vomiting.  She did not check her temperature at home.  She denies any diarrhea.  She has some generalized malaise since the onset of flank pain but no myalgias.          Hospital Course Summary/Primary Discharge Diagnoses:     Acute pyelonephritis with Escherichia coli septicemia    Patient was admitted in the hospital found to have pulmonary nephritis which was evident on CT scan of the abdomen as well as positive blood culture and patient was given IV antibiotics Rocephin and Escherichia coli culture came out to be susceptible to Rocephin and patient being discharged home on by mouth Omnicef.     Secondary Discharge Diagnoses:    Anxiety disorder  COPD  Smoking    PCP  Patient Care Team:  Rosario Alaniz MD as PCP - General (Family Medicine)    Consults:   Consults     No orders found from 1/28/2019 to 2/27/2019.          Operations and Procedures Performed:       Xr Chest 2 View    Result Date: 3/1/2019  Narrative: CHEST 2 VIEWS 3/1/2019  HISTORY: Shortness of breath and cough today. Septic kidney infection. Fever and chills since 2/23/2019.  FINDINGS: The heart is normal in size. There is been interval development of interstitial infiltrates bilaterally suggesting mild interstitial pulmonary edema. Small bilateral pleural effusions, left greater than right and there is airspace consolidation in the left lower lobe characteristic of pneumonia. Minimal atelectasis or infiltrate right base. No pneumothorax.      Impression: 1. Interval development of interstitial infiltrates seen diffusely throughout both lungs probably representing mild pulmonary edema. 2. Small bilateral pleural effusions with infiltrate left lung base suggesting pneumonia. Atelectasis or infiltrate right base.  This report was finalized on  3/1/2019 8:01 AM by Dr. Juan Jose Brownlee MD.      Xr Chest 2 View    Result Date: 2/27/2019  Narrative: CHEST 2 VIEWS 2/27/2019  HISTORY: Fever cough bodyaches and right flank pain for 3 days.  FINDINGS: The heart is normal in size. The lungs are clear. There are no pleural effusions.      Impression: Negative chest.  This report was finalized on 2/27/2019 7:43 AM by Dr. Juan Jose Brownlee MD.      Ct Abdomen Pelvis With Contrast    Result Date: 2/27/2019  Narrative: CT SCAN OF THE ABDOMEN AND PELVIS WITH CONTRAST 2/27/2019  HISTORY: Bilateral flank pain radiating into the back and throughout the entire abdomen for the past 4 days, worsening. Sepsis. Pyelonephritis. Fever and chills with increased frequency of urination.  TECHNIQUE: Spiral CT was performed through the abdomen and pelvis following intravenous contrast administration only as per clinician request. Radiation dose reduction techniques included automated exposure control or exposure modulation based on body size. Radiation audit for CT and nuclear cardiology exams in the last 12 months: 0.  ABDOMEN FINDINGS: The exam is somewhat limited by the lack of oral contrast. There are multiple ill-defined areas of decreased renal cortical enhancement bilaterally characteristic of pyelonephritis. There is also some inflammatory stranding in the bilateral perinephric fat. There is no evidence of hydronephrosis. No cystic or solid mass lesions are seen in either kidney. There is mild heterogeneous fatty infiltration of the liver. There is decreased density in the periportal region suggesting periportal edema of unknown etiology. Spleen, pancreas, and adrenal glands are normal. The gallbladder is mildly distended but otherwise unremarkable.  PELVIS FINDINGS: The gut, mesenteric and jarad structures are normal. There is no free fluid in the abdomen or pelvis. The appendix is normal. Atelectasis or minimal infiltrates seen in the posterior aspect of the lower lobes  bilaterally. There is a small hiatal hernia.      Impression: 1. Multiple ill-defined areas of decreased renal cortical enhancement bilaterally characteristic of pyelonephritis. Inflammatory stranding is seen in the bilateral perinephric fat. Correlation with clinical and laboratory findings is recommended. 2. Heterogeneous fatty infiltration of the liver. 3. Decreased density in the periportal region suggesting periportal edema of unknown etiology. 4. Atelectasis or minimal infiltrates in the posterior aspect of the lower lobes bilaterally, right greater than left. 5. Small hiatal hernia.  This report was finalized on 2/27/2019 10:20 AM by Dr. Juan Jose Brownlee MD.        Allergies:  has No Known Allergies.    Juan  reviewed    Discharge Medications:     Discharge Medications      New Medications      Instructions Start Date   cefdinir 300 MG capsule  Commonly known as:  OMNICEF   300 mg, Oral, 2 Times Daily      ketoprofen 50 MG capsule  Commonly known as:  ORUDIS   50 mg, Oral, 4 Times Daily PRN         Continue These Medications      Instructions Start Date   amphetamine-dextroamphetamine 10 MG tablet  Commonly known as:  ADDERALL   10 mg, Oral, Daily      gabapentin 800 MG tablet  Commonly known as:  NEURONTIN   800 mg, Oral, 3 Times Daily      VYVANSE 10 MG capsule  Generic drug:  lisdexamfetamine dimesylate   10 mg, Oral, Daily             Last Lab Results:   Lab Results (most recent)     Procedure Component Value Units Date/Time    Basic Metabolic Panel [[id number]]  (Abnormal) Collected:  03/02/19 0441    Specimen:  Blood Updated:  03/02/19 0700     Glucose 81 mg/dL      BUN 7 mg/dL      Creatinine 0.64 mg/dL      Sodium 139 mmol/L      Potassium 4.0 mmol/L      Chloride 105 mmol/L      CO2 22.2 mmol/L      Calcium 7.7 mg/dL      eGFR Non African Amer 96 mL/min/1.73      BUN/Creatinine Ratio 10.9     Anion Gap 11.8 mmol/L     Narrative:       GFR Normal >60  Chronic Kidney Disease <60  Kidney Failure <15     Blood Culture - Blood, Arm, Right [[id number]] Collected:  02/27/19 0004    Specimen:  Blood from Arm, Right Updated:  03/02/19 0015     Blood Culture No growth at 3 days    Narrative:       Less than seven (7) mls of blood was collected.  Insufficient quantity may yield false negative results.    Blood Culture - Blood, Arm, Right [[id number]]  (Abnormal)  (Susceptibility) Collected:  02/26/19 2356    Specimen:  Blood from Arm, Right Updated:  03/01/19 0623     Blood Culture Escherichia coli     Isolated from Anaerobic Bottle     Gram Stain Anaerobic Bottle Gram negative bacilli    Susceptibility      Escherichia coli     ARUN     Ampicillin Resistant     Ampicillin + Sulbactam Susceptible     Cefazolin Susceptible     Cefepime Susceptible     Cefoxitin Susceptible     Ceftriaxone Susceptible     Ertapenem Susceptible     Gentamicin Susceptible     Levofloxacin Susceptible     Meropenem Susceptible     Piperacillin + Tazobactam Susceptible     Trimethoprim + Sulfamethoxazole Resistant                    Comprehensive Metabolic Panel [[id number]]  (Abnormal) Collected:  03/01/19 0515    Specimen:  Blood Updated:  03/01/19 0545     Glucose 92 mg/dL      BUN 8 mg/dL      Creatinine 0.77 mg/dL      Sodium 139 mmol/L      Potassium 3.1 mmol/L      Chloride 109 mmol/L      CO2 19.2 mmol/L      Calcium 7.3 mg/dL      Total Protein 5.2 g/dL      Albumin 2.40 g/dL      ALT (SGPT) 20 U/L      AST (SGOT) 24 U/L      Alkaline Phosphatase 169 U/L      Total Bilirubin 0.3 mg/dL      eGFR Non African Amer 78 mL/min/1.73      Globulin 2.8 gm/dL      A/G Ratio 0.9 g/dL      BUN/Creatinine Ratio 10.4     Anion Gap 10.8 mmol/L     CBC (No Diff) [[id number]]  (Abnormal) Collected:  03/01/19 0515    Specimen:  Blood Updated:  03/01/19 0521     WBC 4.96 10*3/mm3      RBC 3.54 10*6/mm3      Hemoglobin 10.6 g/dL      Hematocrit 32.4 %      MCV 91.5 fL      MCH 29.9 pg      MCHC 32.7 g/dL      RDW 13.4 %      RDW-SD 45.8 fl      MPV 10.7 fL       Platelets 119 10*3/mm3     Basic Metabolic Panel [[id number]]  (Abnormal) Collected:  02/28/19 0408    Specimen:  Blood Updated:  02/28/19 0531     Glucose 78 mg/dL      BUN 8 mg/dL      Creatinine 0.90 mg/dL      Sodium 144 mmol/L      Potassium 3.3 mmol/L      Chloride 111 mmol/L      CO2 22.3 mmol/L      Calcium 7.5 mg/dL      eGFR Non African Amer 65 mL/min/1.73      BUN/Creatinine Ratio 8.9     Anion Gap 10.7 mmol/L     Narrative:       GFR Normal >60  Chronic Kidney Disease <60  Kidney Failure <15    CBC (No Diff) [[id number]]  (Abnormal) Collected:  02/28/19 0408    Specimen:  Blood Updated:  02/28/19 0514     WBC 5.88 10*3/mm3      RBC 3.85 10*6/mm3      Hemoglobin 11.5 g/dL      Hematocrit 36.2 %      MCV 94.0 fL      MCH 29.9 pg      MCHC 31.8 g/dL      RDW 13.3 %      RDW-SD 45.9 fl      MPV 12.1 fL      Platelets 126 10*3/mm3     Blood Culture ID, PCR - Blood, Arm, Right [[id number]]  (Abnormal) Collected:  02/26/19 2356    Specimen:  Blood from Arm, Right Updated:  02/27/19 2111     BCID, PCR Escherichia coli. Identification by BCID PCR.    Lactic Acid, Reflex [[id number]]  (Normal) Collected:  02/27/19 0401    Specimen:  Blood Updated:  02/27/19 0421     Lactate 0.7 mmol/L     Lactic Acid, Reflex Timer (This will reflex a repeat order 3-3:15 hours after ordered.) [[id number]] Collected:  02/26/19 2356    Specimen:  Blood Updated:  02/27/19 0330     Extra Tube Hold for add-ons.     Comment: Auto resulted.       P[facility]er Draw [[id number]] Collected:  02/26/19 2356    Specimen:  Blood Updated:  02/27/19 0130    Narrative:       The following orders were created for panel order [facility] Draw.  Procedure                               Abnormality         Status                     ---------                               -----------         ------                     Light Blue Top[[id number]]                                   Final result               Green Top (Gel)[[id number]]                                  Final  result               Lavender Top[[id number]]                                     Final result               Gold Top - SST[[id number]]                                   Final result                 Please view results for these tests on the individual orders.    Lavender Top [[id number]] Collected:  02/26/19 2356    Specimen:  Blood Updated:  02/27/19 0130     Extra Tube hold for add-on     Comment: Auto resulted       CBC & Differential [[id number]] Collected:  02/26/19 2356    Specimen:  Blood Updated:  02/27/19 0128    Narrative:       The following orders were created for panel order CBC & Differential.  Procedure                               Abnormality         Status                     ---------                               -----------         ------                     CBC Auto Differential[[id number]]        Abnormal            Final result                 Please view results for these tests on the individual orders.    CBC Auto Differential [[id number]]  (Abnormal) Collected:  02/26/19 2356    Specimen:  Blood Updated:  02/27/19 0128     WBC 11.82 10*3/mm3      RBC 4.53 10*6/mm3      Hemoglobin 13.7 g/dL      Hematocrit 42.9 %      MCV 94.7 fL      MCH 30.2 pg      MCHC 31.9 g/dL      RDW 13.0 %      RDW-SD 45.1 fl      MPV 11.7 fL      Platelets 143 10*3/mm3      Neutrophil % 85.3 %      Lymphocyte % 5.0 %      Monocyte % 8.3 %      Eosinophil % 0.3 %      Basophil % 0.3 %      Immature Grans % 0.8 %      Neutrophils, Absolute 10.09 10*3/mm3      Lymphocytes, Absolute 0.59 10*3/mm3      Monocytes, Absolute 0.98 10*3/mm3      Eosinophils, Absolute 0.03 10*3/mm3      Basophils, Absolute 0.03 10*3/mm3      Immature Grans, Absolute 0.10 10*3/mm3      nRBC 0.0 /100 WBC     Influenza Antigen, Rapid - Swab, Nasopharynx [[id number]]  (Normal) Collected:  02/27/19 0005    Specimen:  Swab from Nasopharynx Updated:  02/27/19 0112     Influenza A Ag, EIA Negative     Influenza B Ag, EIA Negative    Light Blue Top  [[id number]] Collected:  02/26/19 2356    Specimen:  Blood Updated:  02/27/19 0100     Extra Tube hold for add-on     Comment: Auto resulted       Green Top (Gel) [[id number]] Collected:  02/26/19 2356    Specimen:  Blood Updated:  02/27/19 0100     Extra Tube Hold for add-ons.     Comment: Auto resulted.       Gold Top - SST [[id number]] Collected:  02/26/19 2356    Specimen:  Blood Updated:  02/27/19 0100     Extra Tube Hold for add-ons.     Comment: Auto resulted.       Urinalysis, Microscopic Only - Urine, Clean Catch [[id number]]  (Abnormal) Collected:  02/26/19 2359    Specimen:  Urine, Clean Catch Updated:  02/27/19 0030     RBC, UA None Seen /HPF      WBC, UA 0-2 /HPF      Bacteria, UA 3+ /HPF      Squamous Epithelial Cells, UA 0-2 /HPF      Hyaline Casts, UA None Seen /LPF      Methodology Manual Light Microscopy    Lactic Acid, Plasma [[id number]]  (Abnormal) Collected:  02/26/19 2356    Specimen:  Blood Updated:  02/27/19 0027     Lactate 2.3 mmol/L     Comprehensive Metabolic Panel [[id number]]  (Abnormal) Collected:  02/26/19 2356    Specimen:  Blood Updated:  02/27/19 0024     Glucose 93 mg/dL      BUN 10 mg/dL      Creatinine 1.06 mg/dL      Sodium 131 mmol/L      Potassium 3.5 mmol/L      Chloride 94 mmol/L      CO2 24.0 mmol/L      Calcium 8.0 mg/dL      Total Protein 6.7 g/dL      Albumin 3.20 g/dL      ALT (SGPT) 16 U/L      AST (SGOT) 19 U/L      Alkaline Phosphatase 89 U/L      Total Bilirubin 0.9 mg/dL      eGFR Non African Amer 54 mL/min/1.73      Globulin 3.5 gm/dL      A/G Ratio 0.9 g/dL      BUN/Creatinine Ratio 9.4     Anion Gap 13.0 mmol/L     Urinalysis With Culture If Indicated - Urine, Clean Catch [[id number]]  (Abnormal) Collected:  02/26/19 2359    Specimen:  Urine, Clean Catch Updated:  02/27/19 0009     Color, UA Yellow     Appearance, UA Cloudy     pH, UA 6.0     Specific Gravity, UA 1.020     Glucose, UA Negative     Ketones, UA Negative     Bilirubin, UA Negative     Blood, UA Moderate  (2+)     Protein,  mg/dL (2+)     Leuk Esterase, UA Moderate (2+)     Nitrite, UA Positive     Urobilinogen, UA >=8.0 E.U./dL        Imaging Results (most recent)     Procedure Component Value Units Date/Time    XR Chest 2 View [[id number]] Collected:  03/01/19 0759     Updated:  03/01/19 0803    Narrative:       CHEST 2 VIEWS 3/1/2019     HISTORY:   Shortness of breath and cough today. Septic kidney infection. Fever and  chills since 2/23/2019.     FINDINGS:  The heart is normal in size. There is been interval development of  interstitial infiltrates bilaterally suggesting mild interstitial  pulmonary edema. Small bilateral pleural effusions, left greater than  right and there is airspace consolidation in the left lower lobe  characteristic of pneumonia. Minimal atelectasis or infiltrate right  base. No pneumothorax.       Impression:       1. Interval development of interstitial infiltrates seen diffusely  throughout both lungs probably representing mild pulmonary edema.  2. Small bilateral pleural effusions with infiltrate left lung base  suggesting pneumonia. Atelectasis or infiltrate right base.     This report was finalized on 3/1/2019 8:01 AM by Dr. Juan Jose Brownlee MD.       CT Abdomen Pelvis With Contrast [[id number]] Collected:  02/27/19 1012     Updated:  02/27/19 1023    Narrative:       CT SCAN OF THE ABDOMEN AND PELVIS WITH CONTRAST 2/27/2019     HISTORY:  Bilateral flank pain radiating into the back and throughout the entire  abdomen for the past 4 days, worsening. Sepsis. Pyelonephritis. Fever  and chills with increased frequency of urination.     TECHNIQUE:  Spiral CT was performed through the abdomen and pelvis following  intravenous contrast administration only as per clinician request.  Radiation dose reduction techniques included automated exposure control  or exposure modulation based on body size. Radiation audit for CT and  nuclear cardiology exams in the last 12 months: 0.     ABDOMEN  FINDINGS:  The exam is somewhat limited by the lack of oral contrast. There are  multiple ill-defined areas of decreased renal cortical enhancement  bilaterally characteristic of pyelonephritis. There is also some  inflammatory stranding in the bilateral perinephric fat. There is no  evidence of hydronephrosis. No cystic or solid mass lesions are seen in  either kidney. There is mild heterogeneous fatty infiltration of the  liver. There is decreased density in the periportal region suggesting  periportal edema of unknown etiology. Spleen, pancreas, and adrenal  glands are normal. The gallbladder is mildly distended but otherwise  unremarkable.     PELVIS FINDINGS:  The gut, mesenteric and jarad structures are normal. There is no free  fluid in the abdomen or pelvis. The appendix is normal. Atelectasis or  minimal infiltrates seen in the posterior aspect of the lower lobes  bilaterally. There is a small hiatal hernia.       Impression:       1. Multiple ill-defined areas of decreased renal cortical enhancement  bilaterally characteristic of pyelonephritis. Inflammatory stranding is  seen in the bilateral perinephric fat. Correlation with clinical and  laboratory findings is recommended.  2. Heterogeneous fatty infiltration of the liver.  3. Decreased density in the periportal region suggesting periportal  edema of unknown etiology.  4. Atelectasis or minimal infiltrates in the posterior aspect of the  lower lobes bilaterally, right greater than left.  5. Small hiatal hernia.     This report was finalized on 2/27/2019 10:20 AM by Dr. Juan Jose Brownlee MD.       XR Chest 2 View [[id number]] Collected:  02/27/19 0742     Updated:  02/27/19 0745    Narrative:       CHEST 2 VIEWS 2/27/2019     HISTORY:   Fever cough bodyaches and right flank pain for 3 days.     FINDINGS:  The heart is normal in size. The lungs are clear. There are no pleural  effusions.       Impression:       Negative chest.     This report was finalized on  2/27/2019 7:43 AM by Dr. Juan Jose Brownlee MD.             PROCEDURES      Condition on Discharge:  Stable    Physical Exam at Discharge  Vital Signs  Temp:  [97.3 °F (36.3 °C)-98.2 °F (36.8 °C)] 97.3 °F (36.3 °C)  Heart Rate:  [79-85] 79  Resp:  [16-18] 18  BP: (122-146)/(68-79) 125/72    Physical Exam:  Physical Exam   Constitutional: Patient appears well-developed and well-nourished and in no acute distress   HEENT:   Head: Normocephalic and atraumatic.   Eyes:  Pupils are equal, round, and reactive to light. EOM are intact. Sclera are anicteric and non-injected.  Mouth and Throat: Patient has moist mucous membranes. Oropharynx is clear of any erythema or exudate.     Neck: Neck supple. No JVD present. No thyromegaly present. No lymphadenopathy present.  Cardiovascular: Regular rate, regular rhythm, S1 normal and S2 normal.  Exam reveals no gallop and no friction rub.  No murmur heard.  Pulmonary/Chest: Lungs are clear to auscultation bilaterally. No respiratory distress. No wheezes. No rhonchi. No rales.   Abdominal: Soft. Bowel sounds are normal. No distension and no mass. There is no hepatosplenomegaly. There is no tenderness.   Musculoskeletal: Normal Muscle tone  Extremities: No edema. Pulses are palpable in all 4 extremities.  Neurological: Patient is alert and oriented to person, place, and time. Cranial nerves II-XII are grossly intact with no focal deficits.  Skin: Skin is warm. No rash noted. Nails show no clubbing.  No cyanosis or erythema.    Discharge Disposition  Home    Visiting Nurse:    No     Home PT/OT:  No     Home Safety Evaluation:  No     DME  None     Discharge Diet:      Dietary Orders (From admission, onward)    Start     Ordered    02/27/19 0338  Diet Regular  Diet Effective Now     Question:  Diet Texture / Consistency  Answer:  Regular    02/27/19 0338          Activity at Discharge:  As tolerated    Pre-discharge education  Smoking      Follow-up Appointments  No future  appointments.      Test Results Pending at Discharge   Order Current Status    Blood Culture - Blood, Arm, Right Preliminary result           Joey King MD  03/02/19  2:00 PM    Time: Discharge 32 min which included seeing the patient reviewing the chart as well as documenting discharge summary and going order sets for discharge.          Electronically signed by Joey King MD at 3/2/2019  2:03 PM       Discharge Order (From admission, onward)    Start     Ordered    03/02/19 1355  Discharge patient  Once     Expected Discharge Date:  03/02/19    Expected Discharge Time:  Afternoon    Discharge Disposition:  Home or Self Care    Physician of Record for Attribution - Please select from Treatment Team:  JOEY KING [[id number]]    Review needed by CMO to determine Physician of Record:  No       Question Answer Comment   Physician of Record for Attribution - Please select from Treatment Team JOEY KING    Review needed by CMO to determine Physician of Record No        03/02/19 1359

## 2019-03-04 NOTE — OUTREACH NOTE
Prep Survey      Responses   Facility patient discharged from?  [facility]   Is LACE score < 7 ?  No   Is patient eligible?  Yes   Discharge diagnosis  sepsis d/t acute pyelonephritis   Does the patient have one of the following disease processes/diagnoses(primary or secondary)?  Sepsis   Does the patient have Home health ordered?  No   Is there a DME ordered?  No   Prep survey completed?  Yes          Nafisa Ellis RN

## 2019-03-04 NOTE — OUTREACH NOTE
Sepsis Week 1 Survey      Responses   Facility patient discharged from?  [facility]   Does the patient have one of the following disease processes/diagnoses(primary or secondary)?  Sepsis   Is there a successful TCM telephone encounter documented?  No   Week 1 attempt successful?  Yes   Call start time  1410   Call end time  1429   Discharge diagnosis  sepsis d/t acute pyelonephritis   Is patient permission given to speak with other caregiver?  Yes   List who call center can speak with  , Jesus Joseph reviewed with patient/caregiver?  Yes   Is the patient having any side effects they believe may be caused by any medication additions or changes?  No   Does the patient have all medications related to this admission filled (includes all antibiotics, inhalers, nebulizers,steroids,etc.)  No   What is keeping the patient from filling the prescriptions?  -- [Pharmacy had to order Orudis, should be here today.]   Nursing Interventions  No intervention needed   Is the patient taking all medications as directed (includes completed medication regime)?  Yes   Does the patient have a primary care provider?   Yes   Does the patient have an appointment with their PCP within 7 days of discharge?  No   What is preventing the patient from scheduling follow up appointments within 7 days of discharge?  Haven't had time   Nursing Interventions  Educated patient on importance of making appointment   Has the patient kept scheduled appointments due by today?  N/A   Has home health visited the patient within 72 hours of discharge?  N/A   Psychosocial issues?  No   Did the patient receive a copy of their discharge instructions?  Yes   Nursing interventions  Reviewed instructions with patient, Educated on MyChart   What is the patient's perception of their health status since discharge?  Improving   Nursing interventions  Nurse provided patient education   Is the patient/caregiver able to teach back Sepsis?  S - Shivering,fever or very  cold, E - Extreme pain or generalized discomfort (worst ever,especially abdomen), P - Pale or discolored skin, S - Sleepy, difficult to arouse,confused, I -   I feel like I might die-a feeling of hopelessness, S - Short of breath   Nursing interventions  Nurse provided reassurance to patient, Nurse provided patient education   Is patient/caregiver able to teach back steps to recovery at home?  Rest and regain strength, Set small, achievable goals for return to baseline health, Eat a balanced diet, Exercise as tolerated, Learn about sepsis(sepsis.org)   Is the patient/caregiver able to teach back signs and symptoms of worsening condition:  Fever, Hyperthermia, Rapid heart rate (>90), Shortness of breath/rapid respiratory rate, Altered mental status(confusion/coma), Edema, High blood glucose without diabetes   Is the patient/caregiver able to teach back the hierarchy of who to call/visit for symptoms/problems? PCP, Specialist, Home health nurse, Urgent Care, ED, 911  Yes   Week 1 call completed?  Yes          Susanne Morfin RN

## 2019-03-11 ENCOUNTER — READMISSION MANAGEMENT (OUTPATIENT)
Dept: CALL CENTER | Facility: HOSPITAL | Age: 54
End: 2019-03-11

## 2019-03-11 NOTE — OUTREACH NOTE
Sepsis Week 2 Survey      Responses   Facility patient discharged from?  [facility]   Does the patient have one of the following disease processes/diagnoses(primary or secondary)?  Sepsis   Week 2 attempt successful?  Yes   Call start time  1527   Call end time  1532   Discharge diagnosis  sepsis d/t acute pyelonephritis   Is patient permission given to speak with other caregiver?  Yes   List who call center can speak with  Jesus   Person spoke with today (if not patient) and relationship  , Jesus   Meds reviewed with patient/caregiver?  Yes   Is the patient having any side effects they believe may be caused by any medication additions or changes?  No   Does the patient have all medications related to this admission filled (includes all antibiotics, inhalers, nebulizers,steroids,etc.)  Yes   Is the patient taking all medications as directed (includes completed medication regime)?  Yes   Does the patient have a primary care provider?   Yes   Does the patient have an appointment with their PCP within 7 days of discharge?  Yes   Has the patient kept scheduled appointments due by today?  Yes   Has home health visited the patient within 72 hours of discharge?  N/A   Psychosocial issues?  No   Did the patient receive a copy of their discharge instructions?  Yes   Nursing interventions  Reviewed instructions with patient   What is the patient's perception of their health status since discharge?  Improving   Nursing interventions  Nurse provided patient education   Is the patient/caregiver able to teach back Sepsis?  S - Shivering,fever or very cold, E - Extreme pain or generalized discomfort (worst ever,especially abdomen), P - Pale or discolored skin, S - Sleepy, difficult to arouse,confused, I -   I feel like I might die-a feeling of hopelessness, S - Short of breath   Nursing interventions  Nurse provided reassurance to patient, Nurse provided patient education   Is patient/caregiver able to teach back  steps to recovery at home?  Rest and regain strength, Set small, achievable goals for return to baseline health, Eat a balanced diet, Exercise as tolerated, Learn about sepsis(sepsis.org)   Is the patient/caregiver able to teach back signs and symptoms of worsening condition:  Fever, Hyperthermia, Rapid heart rate (>90), Shortness of breath/rapid respiratory rate, Altered mental status(confusion/coma), Edema, High blood glucose without diabetes   Is the patient/caregiver able to teach back the hierarchy of who to call/visit for symptoms/problems? PCP, Specialist, Home health nurse, Urgent Care, ED, 911  Yes   Week 2 call completed?  Yes          Semaj Bingham RN

## 2020-05-13 ENCOUNTER — APPOINTMENT (OUTPATIENT)
Dept: CT IMAGING | Facility: HOSPITAL | Age: 55
End: 2020-05-13

## 2020-05-13 ENCOUNTER — HOSPITAL ENCOUNTER (EMERGENCY)
Facility: HOSPITAL | Age: 55
Discharge: HOME | End: 2020-05-13
Attending: EMERGENCY MEDICINE | Admitting: EMERGENCY MEDICINE

## 2020-05-13 ENCOUNTER — APPOINTMENT (OUTPATIENT)
Dept: GENERAL RADIOLOGY | Facility: HOSPITAL | Age: 55
End: 2020-05-13

## 2020-05-13 VITALS
DIASTOLIC BLOOD PRESSURE: 96 MMHG | SYSTOLIC BLOOD PRESSURE: 131 MMHG | RESPIRATION RATE: 18 BRPM | TEMPERATURE: 98.1 F | OXYGEN SATURATION: 100 % | WEIGHT: 120 LBS | HEART RATE: 74 BPM | BODY MASS INDEX: 19.29 KG/M2 | HEIGHT: 66 IN

## 2020-05-13 DIAGNOSIS — J40 BRONCHITIS: Primary | ICD-10-CM

## 2020-05-13 LAB
ALBUMIN SERPL-MCNC: 4.2 G/DL (ref 3.5–5.2)
ALBUMIN/GLOB SERPL: 1.6 G/DL
ALP SERPL-CCNC: 69 U/L (ref 39–117)
ALT SERPL W P-5'-P-CCNC: 13 U/L (ref 1–33)
ANION GAP SERPL CALCULATED.3IONS-SCNC: 12.3 MMOL/L (ref 5–15)
AST SERPL-CCNC: 15 U/L (ref 1–32)
B PARAPERT DNA SPEC QL NAA+PROBE: NOT DETECTED
B PERT DNA SPEC QL NAA+PROBE: NOT DETECTED
BASOPHILS # BLD AUTO: 0.04 10*3/MM3 (ref 0–0.2)
BASOPHILS NFR BLD AUTO: 0.6 % (ref 0–1.5)
BILIRUB SERPL-MCNC: 0.4 MG/DL (ref 0.2–1.2)
BUN BLD-MCNC: 5 MG/DL (ref 6–20)
BUN/CREAT SERPL: 7.1 (ref 7–25)
C PNEUM DNA NPH QL NAA+NON-PROBE: NOT DETECTED
CALCIUM SPEC-SCNC: 8.8 MG/DL (ref 8.6–10.5)
CHLORIDE SERPL-SCNC: 105 MMOL/L (ref 98–107)
CO2 SERPL-SCNC: 24.7 MMOL/L (ref 22–29)
CREAT BLD-MCNC: 0.7 MG/DL (ref 0.57–1)
D DIMER PPP FEU-MCNC: 0.52 MCGFEU/ML (ref 0–0.46)
D-LACTATE SERPL-SCNC: 1.1 MMOL/L (ref 0.5–2)
DEPRECATED RDW RBC AUTO: 45.9 FL (ref 37–54)
EOSINOPHIL # BLD AUTO: 0.26 10*3/MM3 (ref 0–0.4)
EOSINOPHIL NFR BLD AUTO: 3.9 % (ref 0.3–6.2)
ERYTHROCYTE [DISTWIDTH] IN BLOOD BY AUTOMATED COUNT: 12.9 % (ref 12.3–15.4)
FLUAV H1 2009 PAND RNA NPH QL NAA+PROBE: NOT DETECTED
FLUAV H1 HA GENE NPH QL NAA+PROBE: NOT DETECTED
FLUAV H3 RNA NPH QL NAA+PROBE: NOT DETECTED
FLUAV SUBTYP SPEC NAA+PROBE: NOT DETECTED
FLUBV RNA ISLT QL NAA+PROBE: NOT DETECTED
GFR SERPL CREATININE-BSD FRML MDRD: 87 ML/MIN/1.73
GLOBULIN UR ELPH-MCNC: 2.7 GM/DL
GLUCOSE BLD-MCNC: 112 MG/DL (ref 65–99)
HADV DNA SPEC NAA+PROBE: NOT DETECTED
HCOV 229E RNA SPEC QL NAA+PROBE: NOT DETECTED
HCOV HKU1 RNA SPEC QL NAA+PROBE: NOT DETECTED
HCOV NL63 RNA SPEC QL NAA+PROBE: NOT DETECTED
HCOV OC43 RNA SPEC QL NAA+PROBE: NOT DETECTED
HCT VFR BLD AUTO: 45.6 % (ref 34–46.6)
HGB BLD-MCNC: 14.6 G/DL (ref 12–15.9)
HMPV RNA NPH QL NAA+NON-PROBE: NOT DETECTED
HPIV1 RNA SPEC QL NAA+PROBE: NOT DETECTED
HPIV2 RNA SPEC QL NAA+PROBE: NOT DETECTED
HPIV3 RNA NPH QL NAA+PROBE: NOT DETECTED
HPIV4 P GENE NPH QL NAA+PROBE: NOT DETECTED
IMM GRANULOCYTES # BLD AUTO: 0.01 10*3/MM3 (ref 0–0.05)
IMM GRANULOCYTES NFR BLD AUTO: 0.2 % (ref 0–0.5)
LYMPHOCYTES # BLD AUTO: 1.63 10*3/MM3 (ref 0.7–3.1)
LYMPHOCYTES NFR BLD AUTO: 24.6 % (ref 19.6–45.3)
M PNEUMO IGG SER IA-ACNC: NOT DETECTED
MCH RBC QN AUTO: 30.4 PG (ref 26.6–33)
MCHC RBC AUTO-ENTMCNC: 32 G/DL (ref 31.5–35.7)
MCV RBC AUTO: 95 FL (ref 79–97)
MONOCYTES # BLD AUTO: 0.46 10*3/MM3 (ref 0.1–0.9)
MONOCYTES NFR BLD AUTO: 6.9 % (ref 5–12)
NEUTROPHILS # BLD AUTO: 4.22 10*3/MM3 (ref 1.7–7)
NEUTROPHILS NFR BLD AUTO: 63.8 % (ref 42.7–76)
PLATELET # BLD AUTO: 229 10*3/MM3 (ref 140–450)
PMV BLD AUTO: 10.2 FL (ref 6–12)
POTASSIUM BLD-SCNC: 4 MMOL/L (ref 3.5–5.2)
PROCALCITONIN SERPL-MCNC: 0.02 NG/ML (ref 0.1–0.25)
PROT SERPL-MCNC: 6.9 G/DL (ref 6–8.5)
RBC # BLD AUTO: 4.8 10*6/MM3 (ref 3.77–5.28)
RHINOVIRUS RNA SPEC NAA+PROBE: NOT DETECTED
RSV RNA NPH QL NAA+NON-PROBE: NOT DETECTED
SODIUM BLD-SCNC: 142 MMOL/L (ref 136–145)
TROPONIN T SERPL-MCNC: <0.01 NG/ML (ref 0–0.03)
WBC NRBC COR # BLD: 6.62 10*3/MM3 (ref 3.4–10.8)

## 2020-05-13 PROCEDURE — 85379 FIBRIN DEGRADATION QUANT: CPT | Performed by: EMERGENCY MEDICINE

## 2020-05-13 PROCEDURE — 93010 ELECTROCARDIOGRAM REPORT: CPT | Performed by: INTERNAL MEDICINE

## 2020-05-13 PROCEDURE — 0100U HC BIOFIRE FILMARRAY RESP PANEL 2: CPT | Performed by: EMERGENCY MEDICINE

## 2020-05-13 PROCEDURE — 84484 ASSAY OF TROPONIN QUANT: CPT | Performed by: EMERGENCY MEDICINE

## 2020-05-13 PROCEDURE — 71275 CT ANGIOGRAPHY CHEST: CPT

## 2020-05-13 PROCEDURE — 71045 X-RAY EXAM CHEST 1 VIEW: CPT

## 2020-05-13 PROCEDURE — 0 IOPAMIDOL PER 1 ML: Performed by: EMERGENCY MEDICINE

## 2020-05-13 PROCEDURE — 85025 COMPLETE CBC W/AUTO DIFF WBC: CPT | Performed by: EMERGENCY MEDICINE

## 2020-05-13 PROCEDURE — 94640 AIRWAY INHALATION TREATMENT: CPT

## 2020-05-13 PROCEDURE — 84145 PROCALCITONIN (PCT): CPT | Performed by: EMERGENCY MEDICINE

## 2020-05-13 PROCEDURE — U0003 INFECTIOUS AGENT DETECTION BY NUCLEIC ACID (DNA OR RNA); SEVERE ACUTE RESPIRATORY SYNDROME CORONAVIRUS 2 (SARS-COV-2) (CORONAVIRUS DISEASE [COVID-19]), AMPLIFIED PROBE TECHNIQUE, MAKING USE OF HIGH THROUGHPUT TECHNOLOGIES AS DESCRIBED BY CMS-2020-01-R: HCPCS | Performed by: EMERGENCY MEDICINE

## 2020-05-13 PROCEDURE — 80053 COMPREHEN METABOLIC PANEL: CPT | Performed by: EMERGENCY MEDICINE

## 2020-05-13 PROCEDURE — 99284 EMERGENCY DEPT VISIT MOD MDM: CPT | Performed by: EMERGENCY MEDICINE

## 2020-05-13 PROCEDURE — 99284 EMERGENCY DEPT VISIT MOD MDM: CPT

## 2020-05-13 PROCEDURE — 93005 ELECTROCARDIOGRAM TRACING: CPT | Performed by: EMERGENCY MEDICINE

## 2020-05-13 PROCEDURE — 83605 ASSAY OF LACTIC ACID: CPT | Performed by: EMERGENCY MEDICINE

## 2020-05-13 RX ORDER — SODIUM CHLORIDE 0.9 % (FLUSH) 0.9 %
10 SYRINGE (ML) INJECTION AS NEEDED
Status: DISCONTINUED | OUTPATIENT
Start: 2020-05-13 | End: 2020-05-13 | Stop reason: HOSPADM

## 2020-05-13 RX ORDER — ALBUTEROL SULFATE 90 UG/1
2 AEROSOL, METERED RESPIRATORY (INHALATION)
Status: DISCONTINUED | OUTPATIENT
Start: 2020-05-13 | End: 2020-05-13 | Stop reason: HOSPADM

## 2020-05-13 RX ADMIN — ALBUTEROL SULFATE 2 PUFF: 90 AEROSOL, METERED RESPIRATORY (INHALATION) at 11:23

## 2020-05-13 RX ADMIN — IOPAMIDOL 100 ML: 755 INJECTION, SOLUTION INTRAVENOUS at 12:33

## 2020-05-13 NOTE — ED PROVIDER NOTES
" EMERGENCY DEPARTMENT ENCOUNTER      Room Number: 1B/1B      HPI:    Chief complaint: Cough, low-grade fever, fatigue and pleuritic chest pain.    Location: Pleuritic chest pain most notable in central back.    Quality/Severity: Moderate    Timing/Duration: Symptoms started approximately 10 days ago with fatigue and followed by cough and other symptoms.    Modifying Factors: None    Associated Symptoms: Scratchy throat and \"feels like a hair stuck in my throat\".    Narrative: Pt is a 54 y.o. female who presents complaining of cough and low-grade fever as noted above.  Patient did contact her primary care provider who instructed the patient to be evaluated for COVID-19 in our emergency department.  Patient is a heavy smoker, but denies other underlying medical problems.  Patient has been practicing \"safe at home\" and has not had any known exposures to coronavirus.      PMD: Rosario Alaniz MD    REVIEW OF SYSTEMS  Review of Systems   Constitutional: Positive for activity change, appetite change, fatigue and fever (T-max of 100 °F last night).   HENT: Positive for sore throat (Scratchy). Negative for congestion, rhinorrhea, trouble swallowing and voice change.    Eyes: Negative for discharge and redness.   Respiratory: Positive for cough (Dry). Negative for shortness of breath and wheezing.    Cardiovascular: Positive for chest pain (Described as pleuritic in mid back). Negative for palpitations and leg swelling.   Gastrointestinal: Negative for abdominal pain, diarrhea, nausea and vomiting.   Endocrine: Negative for polydipsia.   Genitourinary: Negative for difficulty urinating, dysuria, flank pain, frequency and urgency.   Musculoskeletal: Negative for back pain.   Skin: Negative for rash.   Neurological: Negative for dizziness, weakness and headaches.   Psychiatric/Behavioral: Negative for confusion.   All other systems reviewed and are negative.      PAST MEDICAL HISTORY  Active Ambulatory Problems     " Diagnosis Date Noted   • Sepsis (CMS/HCC) 02/27/2019     Resolved Ambulatory Problems     Diagnosis Date Noted   • No Resolved Ambulatory Problems     Past Medical History:   Diagnosis Date   • ADHD (attention deficit hyperactivity disorder)    • Anxiety    • Back pain    • DDD (degenerative disc disease), cervical    • MRSA (methicillin resistant staph aureus) culture positive    • Neck pain    • Vertigo 2017       PAST SURGICAL HISTORY  Past Surgical History:   Procedure Laterality Date   • HYSTERECTOMY     • OVARIAN CYST REMOVAL         FAMILY HISTORY  History reviewed. No pertinent family history.    SOCIAL HISTORY  Social History     Socioeconomic History   • Marital status:      Spouse name: Not on file   • Number of children: Not on file   • Years of education: Not on file   • Highest education level: Not on file   Tobacco Use   • Smoking status: Current Every Day Smoker     Packs/day: 1.50     Years: 40.00     Pack years: 60.00     Types: Cigarettes   • Smokeless tobacco: Never Used   Substance and Sexual Activity   • Alcohol use: No   • Drug use: No   • Sexual activity: Defer       ALLERGIES  Patient has no known allergies.    PHYSICAL EXAM  ED Triage Vitals [05/13/20 0957]   Temp Heart Rate Resp BP SpO2   98.1 °F (36.7 °C) 86 18 146/93 100 %      Temp src Heart Rate Source Patient Position BP Location FiO2 (%)   Oral Monitor Sitting Left arm --       Physical Exam   Constitutional: She is oriented to person, place, and time.   The patient is a thin, 54-year-old, white female no acute distress.   HENT:   Head: Normocephalic and atraumatic.   Eyes: Conjunctivae and EOM are normal.   Neck: Normal range of motion. Neck supple. No thyromegaly present.   Cardiovascular: Normal rate, regular rhythm and normal heart sounds.   No murmur heard.  Pulmonary/Chest: Effort normal and breath sounds normal. No respiratory distress.   Minimal, bibasilar rhonchi with good exchange of air bilaterally.   Abdominal:  Soft. Bowel sounds are normal. There is no tenderness.   Musculoskeletal: Normal range of motion. She exhibits no edema.   Lymphadenopathy:     She has no cervical adenopathy.   Neurological: She is alert and oriented to person, place, and time.   Skin: Skin is warm and dry.   Psychiatric: Affect and judgment normal.   Nursing note and vitals reviewed.      LAB RESULTS  Results for orders placed or performed during the hospital encounter of 05/13/20   D-dimer, Quantitative   Result Value Ref Range    D-Dimer, Quantitative 0.52 (H) 0.00 - 0.46 MCGFEU/mL   Procalcitonin   Result Value Ref Range    Procalcitonin 0.02 (L) 0.10 - 0.25 ng/mL   Lactic Acid, Plasma   Result Value Ref Range    Lactate 1.1 0.5 - 2.0 mmol/L   Comprehensive Metabolic Panel   Result Value Ref Range    Glucose 112 (H) 65 - 99 mg/dL    BUN 5 (L) 6 - 20 mg/dL    Creatinine 0.70 0.57 - 1.00 mg/dL    Sodium 142 136 - 145 mmol/L    Potassium 4.0 3.5 - 5.2 mmol/L    Chloride 105 98 - 107 mmol/L    CO2 24.7 22.0 - 29.0 mmol/L    Calcium 8.8 8.6 - 10.5 mg/dL    Total Protein 6.9 6.0 - 8.5 g/dL    Albumin 4.20 3.50 - 5.20 g/dL    ALT (SGPT) 13 1 - 33 U/L    AST (SGOT) 15 1 - 32 U/L    Alkaline Phosphatase 69 39 - 117 U/L    Total Bilirubin 0.4 0.2 - 1.2 mg/dL    eGFR Non African Amer 87 >60 mL/min/1.73    Globulin 2.7 gm/dL    A/G Ratio 1.6 g/dL    BUN/Creatinine Ratio 7.1 7.0 - 25.0    Anion Gap 12.3 5.0 - 15.0 mmol/L   CBC Auto Differential   Result Value Ref Range    WBC 6.62 3.40 - 10.80 10*3/mm3    RBC 4.80 3.77 - 5.28 10*6/mm3    Hemoglobin 14.6 12.0 - 15.9 g/dL    Hematocrit 45.6 34.0 - 46.6 %    MCV 95.0 79.0 - 97.0 fL    MCH 30.4 26.6 - 33.0 pg    MCHC 32.0 31.5 - 35.7 g/dL    RDW 12.9 12.3 - 15.4 %    RDW-SD 45.9 37.0 - 54.0 fl    MPV 10.2 6.0 - 12.0 fL    Platelets 229 140 - 450 10*3/mm3    Neutrophil % 63.8 42.7 - 76.0 %    Lymphocyte % 24.6 19.6 - 45.3 %    Monocyte % 6.9 5.0 - 12.0 %    Eosinophil % 3.9 0.3 - 6.2 %    Basophil % 0.6 0.0 -  1.5 %    Immature Grans % 0.2 0.0 - 0.5 %    Neutrophils, Absolute 4.22 1.70 - 7.00 10*3/mm3    Lymphocytes, Absolute 1.63 0.70 - 3.10 10*3/mm3    Monocytes, Absolute 0.46 0.10 - 0.90 10*3/mm3    Eosinophils, Absolute 0.26 0.00 - 0.40 10*3/mm3    Basophils, Absolute 0.04 0.00 - 0.20 10*3/mm3    Immature Grans, Absolute 0.01 0.00 - 0.05 10*3/mm3   Troponin   Result Value Ref Range    Troponin T <0.010 0.000 - 0.030 ng/mL         I ordered the above labs and reviewed the results    RADIOLOGY  Xr Chest 1 View    Result Date: 5/13/2020  Narrative: CHEST X-RAY, 05/13/2020     HISTORY: 54-year-old female in the ED complaining of one week history of cough, feeling increasingly below the last 2 days.  TECHNIQUE: AP portable upright chest x-ray.  COMPARISON: *  Chest x-ray, 03/01/2019.  FINDINGS: The examination is negative today. The lungs are expanded and clear. Diffuse pulmonary edema or infiltrate present on the previous study has resolved. There is no visible pleural effusion today. Heart size and pulmonary vascularity are normal.      Impression: Negative chest.  This report was finalized on 5/13/2020 11:48 AM by Dr. Karan Yates MD.      Ct Angiogram Chest    Result Date: 5/13/2020  Narrative: CT CHEST WITH CONTRAST, PE PROTOCOL, 05/13/2020  HISTORY: 54-year-old female the ED with 1 week history chest pain and shortness of air. Smoker.  TECHNIQUE:  CT examination of the chest with IV contrast. CTA MIP multiplanar pulmonary artery images were reformatted with 3-D postprocessing. Radiation dose reduction techniques included automated exposure control or exposure modulation based on body size. Radiation audit for CT and nuclear cardiology exams in the last 12 months: 0.  FINDINGS:  No pulmonary embolism is demonstrated. Thoracic aorta is normal in caliber with no aneurysm or dissection. Heart size is normal, and there is no pericardial effusion.  The lungs are expanded and clear. No pulmonary infiltrate,  pneumothorax or pleural effusion.  No mass or adenopathy within the chest. Trachea and central bronchi are normal in caliber and widely patent. Portions of the thoracic esophagus show potential wall thickening, correlate for clinical evidence of esophagitis. No hiatal hernia. No gastric distention.       Impression: 1.  No evidence of pulmonary embolism or other acute vascular abnormality within the chest. 2.  The lungs are clear. 3.  Potential mild esophageal wall thickening. Correlate for esophagitis.  This report was finalized on 5/13/2020 12:40 PM by Dr. Karan Yates MD.        I ordered the above radiologic testing and reviewed the results    PROCEDURES  Procedures      PROGRESS AND CONSULTS  ED Course as of May 13 1303   Wed May 13, 2020   1052 EKG tracing was contemporaneously reviewed at 1049 hrs. and showed a normal sinus rhythm with a rate of 73 bpm.  Minimal and nondiagnostic diffuse ST depression.  No ectopy.    [ML]   1133 Full PPE including N 95 mask use during initial history and physical.  D-dimer noted to be mildly elevated and CT of the chest will be obtained.    [ML]   1158 Final radiology report on the chest x-ray noted.  Elevated d-dimer discussed with patient and she is agreeable to proceed with CT.    [ML]   1259 Final CT report noted and all pertinent findings discussed with patient.  Patient's history and work-up mostly indicative of a viral bronchitis and not a COVID-19 infection.  This was explained to the patient along with treatment plan, expectations and warnings.    [ML]      ED Course User Index  [ML] Ramsey Munguia MD           MEDICAL DECISION MAKING  Results were reviewed/discussed with the patient and they were also made aware of online access. Pt also made aware that some labs, such as cultures, will not be resulted during ER visit and follow up with PMD is necessary.     MDM       DIAGNOSIS  Final diagnoses:   Bronchitis       Latest Documented Vital Signs:  As of  13:03  BP- 143/86 HR- 74 Temp- 98.1 °F (36.7 °C) (Oral) O2 sat- 99%    DISPOSITION  Discharged in good condition       Medication List      New Prescriptions    HYDROcodone-homatropine 5-1.5 MG/5ML syrup  Commonly known as:  HYCODAN  Take 5 mL by mouth Every 6 (Six) Hours As Needed for Cough.          Follow-up Information     Rosario Alaniz MD In 3 days.    Specialty:  Family Medicine  Why:  If symptoms worsen  Contact information:  [address]  [address]  [place] [address] [phone]                      Ramsey Munguia MD  05/13/20 4503

## 2020-05-13 NOTE — ED TRIAGE NOTES
Pt arrives in triage stating Dr Alaniz sent her for Covid testing and xray, states no fever today, states temp 100 last night, cough for 5 weeks and soa

## 2020-05-15 ENCOUNTER — NURSE TRIAGE (OUTPATIENT)
Dept: CALL CENTER | Facility: HOSPITAL | Age: 55
End: 2020-05-15

## 2020-05-15 ENCOUNTER — PATIENT OUTREACH (OUTPATIENT)
Dept: CASE MANAGEMENT | Facility: OTHER | Age: 55
End: 2020-05-15

## 2020-05-15 ENCOUNTER — EPISODE CHANGES (OUTPATIENT)
Dept: CASE MANAGEMENT | Facility: OTHER | Age: 55
End: 2020-05-15

## 2020-05-15 LAB — SARS-COV-2 RNA RESP QL NAA+PROBE: NOT DETECTED

## 2020-05-15 NOTE — TELEPHONE ENCOUNTER
"    Reason for Disposition  • [1] Follow-up call from patient regarding patient's clinical status AND [2] information NON-URGENT    Additional Information  • Negative: Lab calling with strep throat test results and triager can call in prescription  • Negative: Lab calling with urinalysis test results and triager can call in prescription  • Negative: Medication questions  • Negative: ED call to PCP  • Negative: Physician call to PCP  • Negative: Call about patient who is currently hospitalized  • Negative: Lab or radiology calling with CRITICAL test results  • Negative: [1] Prescription not at pharmacy AND [2] was prescribed today by PCP  • Negative: [1] Follow-up call from patient regarding patient's clinical status AND [2] information urgent  • Negative: [1] Caller requests to speak ONLY to PCP AND [2] URGENT question  • Negative: [1] Caller requests to speak to PCP now AND [2] won't tell us reason for call  (Exception: if 10 pm to 6 am, caller must first discuss reason for the call)  • Negative: Notification of hospital admission  • Negative: Notification of death  • Negative: Caller requesting lab results  • Negative: Lab or radiology calling with test results    Answer Assessment - Initial Assessment Questions  1. REASON FOR CALL or QUESTION: \"What is your reason for calling today?\" or \"How can I best  help you?\" or \"What question do you have that I can help answer?\"      COVID test result request.  Test is not back yet.  Obtained on 5/13/20.   2. CALLER: Document the source of call. (e.g., laboratory, patient).      Patient    Protocols used: PCP CALL - NO TRIAGE-ADULT-      "

## 2020-05-15 NOTE — OUTREACH NOTE
ED Potential Covid Discharge Follow-up    Call to pt regarding ED visit 5/13 with COVID testing with negative results. Pt states she is feeling better. She is aware that her results are negative. Pt given instructions, as per CDC guidelines,  that even if test negative they are recommending self isolation. Pt should be fever free for72 hours without the use of medication. Symptom free for 72 hours such as cough or shortness of breath. Even though test results negative pt should follow up with their PCP. Pt states she has an apt with MD on 5/22. She is afraid she will run out of cough medicine before she will see him. Pt informed she will need to contact PCP if this happens or buy OTC ED is unable to provide refills. Pt verbalized understanding of self quarantine. No questions at this time.    Care Coordination Note    Call to Dr Alaniz office and spoke with Katlin. Notified of testing with negative results.     Elisa Walker RN  Ambulatory     5/15/2020, 13:14

## 2020-06-09 ENCOUNTER — TRANSCRIBE ORDERS (OUTPATIENT)
Dept: ADMINISTRATIVE | Facility: HOSPITAL | Age: 55
End: 2020-06-09

## 2020-06-09 DIAGNOSIS — Z78.0 MENOPAUSE: Primary | ICD-10-CM

## 2020-06-09 DIAGNOSIS — Z12.31 VISIT FOR SCREENING MAMMOGRAM: ICD-10-CM

## 2020-08-24 ENCOUNTER — TRANSCRIBE ORDERS (OUTPATIENT)
Dept: ADMINISTRATIVE | Facility: HOSPITAL | Age: 55
End: 2020-08-24

## 2020-08-24 DIAGNOSIS — M50.30 DDD (DEGENERATIVE DISC DISEASE), CERVICAL: Primary | ICD-10-CM

## 2020-08-27 ENCOUNTER — HOSPITAL ENCOUNTER (OUTPATIENT)
Dept: MRI IMAGING | Facility: HOSPITAL | Age: 55
Discharge: HOME | End: 2020-08-27
Admitting: ANESTHESIOLOGY

## 2020-08-27 DIAGNOSIS — M50.30 DDD (DEGENERATIVE DISC DISEASE), CERVICAL: ICD-10-CM

## 2020-08-27 PROCEDURE — 72141 MRI NECK SPINE W/O DYE: CPT

## 2022-04-01 ENCOUNTER — TRANSCRIBE ORDERS (OUTPATIENT)
Dept: ADMINISTRATIVE | Facility: HOSPITAL | Age: 57
End: 2022-04-01

## 2022-04-01 ENCOUNTER — HOSPITAL ENCOUNTER (OUTPATIENT)
Dept: GENERAL RADIOLOGY | Facility: HOSPITAL | Age: 57
Discharge: HOME | End: 2022-04-01
Admitting: FAMILY MEDICINE

## 2022-04-01 DIAGNOSIS — R05.9 COUGH: Primary | ICD-10-CM

## 2022-04-01 DIAGNOSIS — R05.9 COUGH IN ADULT: ICD-10-CM

## 2022-04-01 PROCEDURE — 71046 X-RAY EXAM CHEST 2 VIEWS: CPT

## 2022-05-03 ENCOUNTER — TRANSCRIBE ORDERS (OUTPATIENT)
Dept: BONE DENSITY | Facility: HOSPITAL | Age: 57
End: 2022-05-03

## 2022-05-03 DIAGNOSIS — Z78.0 MENOPAUSE: Primary | ICD-10-CM

## 2023-09-27 ENCOUNTER — APPOINTMENT (OUTPATIENT)
Dept: GENERAL RADIOLOGY | Facility: HOSPITAL | Age: 58
End: 2023-09-27
Payer: COMMERCIAL

## 2023-09-27 ENCOUNTER — HOSPITAL ENCOUNTER (EMERGENCY)
Facility: HOSPITAL | Age: 58
Discharge: HOME | End: 2023-09-27
Attending: EMERGENCY MEDICINE | Admitting: EMERGENCY MEDICINE
Payer: COMMERCIAL

## 2023-09-27 VITALS
WEIGHT: 115 LBS | OXYGEN SATURATION: 98 % | SYSTOLIC BLOOD PRESSURE: 135 MMHG | DIASTOLIC BLOOD PRESSURE: 76 MMHG | TEMPERATURE: 98.2 F | RESPIRATION RATE: 18 BRPM | HEIGHT: 67 IN | HEART RATE: 81 BPM | BODY MASS INDEX: 18.05 KG/M2

## 2023-09-27 DIAGNOSIS — B88.0 CHIGGER BITE: ICD-10-CM

## 2023-09-27 DIAGNOSIS — J06.9 VIRAL URI WITH COUGH: Primary | ICD-10-CM

## 2023-09-27 LAB
BACTERIA UR QL AUTO: ABNORMAL /HPF
BILIRUB UR QL STRIP: NEGATIVE
CLARITY UR: CLEAR
COLOR UR: YELLOW
FLUAV RNA RESP QL NAA+PROBE: NOT DETECTED
FLUBV RNA RESP QL NAA+PROBE: NOT DETECTED
GLUCOSE UR STRIP-MCNC: NEGATIVE MG/DL
HGB UR QL STRIP.AUTO: NEGATIVE
HYALINE CASTS UR QL AUTO: ABNORMAL /LPF
KETONES UR QL STRIP: NEGATIVE
LEUKOCYTE ESTERASE UR QL STRIP.AUTO: ABNORMAL
NITRITE UR QL STRIP: NEGATIVE
PH UR STRIP.AUTO: 6.5 [PH] (ref 4.5–8)
PROT UR QL STRIP: NEGATIVE
RBC # UR STRIP: ABNORMAL /HPF
REF LAB TEST METHOD: ABNORMAL
SARS-COV-2 RNA RESP QL NAA+PROBE: NOT DETECTED
SP GR UR STRIP: 1.01 (ref 1–1.03)
SQUAMOUS #/AREA URNS HPF: ABNORMAL /HPF
UROBILINOGEN UR QL STRIP: ABNORMAL
WBC # UR STRIP: ABNORMAL /HPF

## 2023-09-27 PROCEDURE — 87636 SARSCOV2 & INF A&B AMP PRB: CPT | Performed by: EMERGENCY MEDICINE

## 2023-09-27 PROCEDURE — 81001 URINALYSIS AUTO W/SCOPE: CPT | Performed by: EMERGENCY MEDICINE

## 2023-09-27 PROCEDURE — 71045 X-RAY EXAM CHEST 1 VIEW: CPT

## 2023-09-27 PROCEDURE — 99283 EMERGENCY DEPT VISIT LOW MDM: CPT

## 2023-09-27 RX ORDER — ARIPIPRAZOLE 5 MG/1
5 TABLET ORAL DAILY
COMMUNITY

## 2023-09-27 RX ORDER — FLUOXETINE HYDROCHLORIDE 20 MG/1
40 CAPSULE ORAL DAILY
COMMUNITY

## 2023-09-27 RX ORDER — GABAPENTIN 100 MG/1
100 CAPSULE ORAL 3 TIMES DAILY
COMMUNITY

## 2023-09-28 NOTE — ED PROVIDER NOTES
Subjective   History of Present Illness  History of Present Illness    Chief complaint: Rash, and cough    Location: Rash on the trunk    Quality/Severity: Itches    Timing/Onset/Duration: today    Modifying Factors: Feels better to scratch    Associated Symptoms: No headache.  No fever.  Patient said chills.  Patient's had nonproductive cough.  No sore throat earache or nasal congestion.  No chest pain.  Patient complains of bilateral flank pain.  No abdominal pain.  No diarrhea or burning when he urinates.  No nausea or vomiting    Narrative: This 57-year-old white female who has been working out in the weeds presents with an itchy rash.  The  has had chiggers.  The rash started today.  The patient also has a cough and chills.  Patient complains of bilateral flank pain.  No new meds, perfumes, lotions, soaps, shampoos, or unusual foods.    PCP:Rosario Alaniz MD   Review of Systems   Constitutional:  Positive for chills. Negative for fever.   HENT:  Negative for congestion and sore throat.    Respiratory:  Positive for cough. Negative for shortness of breath.    Cardiovascular:  Negative for chest pain.   Gastrointestinal:  Negative for abdominal pain, diarrhea, nausea and vomiting.   Genitourinary:  Negative for difficulty urinating.   Musculoskeletal:  Negative for back pain.   Skin:  Positive for rash.   Neurological:  Negative for headaches.   Psychiatric/Behavioral:  Negative for confusion.        Past Medical History:   Diagnosis Date    ADHD (attention deficit hyperactivity disorder)     Anxiety     Back pain     DDD (degenerative disc disease), cervical     MRSA (methicillin resistant staph aureus) culture positive     Neck pain     Vertigo 2017       No Known Allergies    Past Surgical History:   Procedure Laterality Date    HYSTERECTOMY      OVARIAN CYST REMOVAL         History reviewed. No pertinent family history.    Social History     Socioeconomic History    Marital status:     Tobacco Use    Smoking status: Every Day     Packs/day: 1.50     Years: 40.00     Pack years: 60.00     Types: Cigarettes    Smokeless tobacco: Never   Substance and Sexual Activity    Alcohol use: No    Drug use: No    Sexual activity: Defer           Objective   Physical Exam  Vitals (The temperature is 98.2 °F, pulse 102, respirations 18, /76, room air pulse ox 98%.) and nursing note reviewed.   Constitutional:       Appearance: Normal appearance.   HENT:      Head: Normocephalic and atraumatic.      Right Ear: Tympanic membrane normal.      Left Ear: Tympanic membrane normal.      Nose: Nose normal.      Mouth/Throat:      Mouth: Mucous membranes are moist.   Cardiovascular:      Rate and Rhythm: Normal rate and regular rhythm.      Pulses: Normal pulses.      Heart sounds: Normal heart sounds.   Pulmonary:      Effort: Pulmonary effort is normal.      Breath sounds: Normal breath sounds.   Abdominal:      General: Abdomen is flat. Bowel sounds are normal. There is no distension.      Palpations: Abdomen is soft. There is no mass.      Tenderness: There is no abdominal tenderness. There is no right CVA tenderness, left CVA tenderness, guarding or rebound.      Hernia: No hernia is present.   Musculoskeletal:         General: Normal range of motion.      Cervical back: Normal range of motion and neck supple.   Skin:     General: Skin is warm and dry.      Capillary Refill: Capillary refill takes less than 2 seconds.      Findings: Rash present.      Comments: There are erythematous papules consistent with bug bites on the trunk.  They mikayla to the touch.  There is no purpura or petechiae.   Neurological:      General: No focal deficit present.      Mental Status: She is alert and oriented to person, place, and time.       Procedures           ED Course  ED Course as of 09/27/23 2315   Wed Sep 27, 2023   2313 The COVID flu is negative [RC]   2313 The urinalysis shows trace leukocytes, no RBCs, 3-5 WBCs,  no bacteria, nitrite negative. [RC]      ED Course User Index  [RC] Clement May MD      23:16 EDT, 09/27/23: The patient's diagnosis of chigger bites, and viral URI with cough was discussed with her.  The patient should take Robitussin as needed as directed for cough.  Patient should take Benadryl as needed as directed for itching.  The patient should follow-up with Dr. Alaniz within 1 week.  She should return to the emergency department if there is fever, shortness of breath, worse in any way at all.                                       Medical Decision Making      Final diagnoses:   None       ED Disposition  ED Disposition       None            No follow-up provider specified.       Medication List      No changes were made to your prescriptions during this visit.            Clement May MD  09/27/23 4026

## 2023-09-28 NOTE — DISCHARGE INSTRUCTIONS
Take Benadryl as needed as directed for itching.  Take the Robitussin as needed as directed for cough.  Follow-up with Dr. Moore in 1 week.  Return to the emergency department if there is shortness of breath, worse in any way at all

## 2024-06-11 ENCOUNTER — HOSPITAL ENCOUNTER (OUTPATIENT)
Dept: GENERAL RADIOLOGY | Facility: HOSPITAL | Age: 59
Discharge: HOME | End: 2024-06-11
Admitting: FAMILY MEDICINE
Payer: COMMERCIAL

## 2024-06-11 ENCOUNTER — TRANSCRIBE ORDERS (OUTPATIENT)
Dept: ADMINISTRATIVE | Facility: HOSPITAL | Age: 59
End: 2024-06-11
Payer: COMMERCIAL

## 2024-06-11 ENCOUNTER — TRANSCRIBE ORDERS (OUTPATIENT)
Dept: BONE DENSITY | Facility: HOSPITAL | Age: 59
End: 2024-06-11
Payer: COMMERCIAL

## 2024-06-11 DIAGNOSIS — Z78.0 ENCOUNTER FOR OSTEOPOROSIS SCREENING IN ASYMPTOMATIC POSTMENOPAUSAL PATIENT: Primary | ICD-10-CM

## 2024-06-11 DIAGNOSIS — J40 BRONCHITIS: Primary | ICD-10-CM

## 2024-06-11 DIAGNOSIS — Z13.820 ENCOUNTER FOR OSTEOPOROSIS SCREENING IN ASYMPTOMATIC POSTMENOPAUSAL PATIENT: Primary | ICD-10-CM

## 2024-06-11 DIAGNOSIS — J40 BRONCHITIS: ICD-10-CM

## 2024-06-11 PROCEDURE — 71046 X-RAY EXAM CHEST 2 VIEWS: CPT

## 2024-06-17 ENCOUNTER — APPOINTMENT (OUTPATIENT)
Dept: BONE DENSITY | Facility: HOSPITAL | Age: 59
End: 2024-06-17
Payer: COMMERCIAL

## 2024-06-17 DIAGNOSIS — Z13.820 ENCOUNTER FOR OSTEOPOROSIS SCREENING IN ASYMPTOMATIC POSTMENOPAUSAL PATIENT: ICD-10-CM

## 2024-06-17 DIAGNOSIS — Z78.0 ENCOUNTER FOR OSTEOPOROSIS SCREENING IN ASYMPTOMATIC POSTMENOPAUSAL PATIENT: ICD-10-CM

## 2024-06-17 PROCEDURE — 77080 DXA BONE DENSITY AXIAL: CPT

## 2024-07-08 ENCOUNTER — OFFICE VISIT (OUTPATIENT)
Age: 59
End: 2024-07-08
Payer: COMMERCIAL

## 2024-07-08 VITALS
BODY MASS INDEX: 18.48 KG/M2 | OXYGEN SATURATION: 98 % | WEIGHT: 115 LBS | SYSTOLIC BLOOD PRESSURE: 149 MMHG | DIASTOLIC BLOOD PRESSURE: 85 MMHG | HEIGHT: 66 IN | HEART RATE: 72 BPM

## 2024-07-08 DIAGNOSIS — F33.1 MDD (MAJOR DEPRESSIVE DISORDER), RECURRENT EPISODE, MODERATE: ICD-10-CM

## 2024-07-08 DIAGNOSIS — F41.1 GAD (GENERALIZED ANXIETY DISORDER): Primary | ICD-10-CM

## 2024-07-08 RX ORDER — TRAMADOL HYDROCHLORIDE 50 MG/1
50 TABLET ORAL DAILY PRN
COMMUNITY
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
## 2024-07-09 ENCOUNTER — TELEPHONE (OUTPATIENT)
Age: 59
End: 2024-07-09
Payer: COMMERCIAL

## 2024-07-09 NOTE — TELEPHONE ENCOUNTER
Called PT PCP office. Requested labs. Office sending over shortly. Last TSH was over a year ago, all other labs are from 6/11.

## 2024-07-21 PROBLEM — F33.1 MDD (MAJOR DEPRESSIVE DISORDER), RECURRENT EPISODE, MODERATE: Status: ACTIVE | Noted: 2024-07-21

## 2024-07-21 PROBLEM — F41.1 GAD (GENERALIZED ANXIETY DISORDER): Status: ACTIVE | Noted: 2024-07-21

## 2024-07-29 ENCOUNTER — OFFICE VISIT (OUTPATIENT)
Age: 59
End: 2024-07-29
Payer: COMMERCIAL

## 2024-07-29 VITALS
WEIGHT: 115 LBS | BODY MASS INDEX: 18.48 KG/M2 | HEART RATE: 70 BPM | OXYGEN SATURATION: 97 % | HEIGHT: 66 IN | DIASTOLIC BLOOD PRESSURE: 80 MMHG | SYSTOLIC BLOOD PRESSURE: 129 MMHG

## 2024-07-29 DIAGNOSIS — F33.1 MDD (MAJOR DEPRESSIVE DISORDER), RECURRENT EPISODE, MODERATE: ICD-10-CM

## 2024-07-29 DIAGNOSIS — F41.1 GAD (GENERALIZED ANXIETY DISORDER): Primary | ICD-10-CM

## 2024-07-29 PROCEDURE — 99214 OFFICE O/P EST MOD 30 MIN: CPT

## 2024-07-29 RX ORDER — VILAZODONE HYDROCHLORIDE 10 MG/1
10 TABLET ORAL DAILY
Qty: 30 TABLET | Refills: 1 | Status: SHIPPED | OUTPATIENT
Start: 2024-07-29 | End: 2024-09-27

## 2024-07-29 RX ORDER — FLUOXETINE HYDROCHLORIDE 20 MG/1
20 CAPSULE ORAL TAKE AS DIRECTED
Qty: 6 CAPSULE | Refills: 0 | Status: SHIPPED | OUTPATIENT
Start: 2024-07-29

## 2024-07-29 NOTE — PROGRESS NOTES
"     Office  Follow Up Visit      Patient Name: Olga Hansen  : 1965   MRN: [id number]     Referring Provider: Rosario Alaniz MD    Chief Complaint:  \"Depression and anxiety\"     ICD-10-CM ICD-9-CM   1. MALACHI (generalized anxiety disorder)  F41.1 300.02   2. MDD (major depressive disorder), recurrent episode, moderate  F33.1 296.32      History of Present Illness:   Olga Hansen is a 58 y.o. female who is here today for follow up. Patient has a history of MDD, MALACHI, and ADHD (no formal testing completed). During pt's last appointment her primary complaint was depression/anxiety that has worsened since her son passed away in 2024. Her PCP increased her Prozac to 80mg daily 2-3 weeks prior to her first appointment with this provider. During last appointment we decided to wait another 3 weeks to see full effect of the dose increase before making anymore changes.     Today still reports depression and anxiety.  Patient reports the following depressive symptoms today: Feeling depressed, losing interest in things that she usually finds pleasure in, fatigue, lack of concentration, poor appetite, feeling bad about herself, and restlessness.  Additionally, patient reports the following symptoms of anxiety: Feeling anxious, worrying about multiple different things and having difficulty controlling the worry, restlessness, and trouble relaxing.  Denies panic attacks.  Reports that she is anxious about not feeling better and wondering if she will ever feel better.  Reports to be sleeping about 6 hours each night but has difficulty staying asleep.  Patient is currently taking Prozac 80 mg daily.  States that this medication worked well for her for years but feels that the medication stopped working on her months ago and is wishing to make a change in her medication.  States that she is anxious about switching her medication as she has had bad experiences in the past during cross tapers.  Denies " SI/HI/AVH.  Denies any side effects to Prozac.    Subjective      Review of Systems:   Review of Systems   Respiratory:  Negative for chest tightness and shortness of breath.    Gastrointestinal:  Negative for abdominal pain, constipation, diarrhea, nausea and vomiting.   Genitourinary:  Negative for difficulty urinating.   Neurological:  Negative for headaches.   Psychiatric/Behavioral:  Positive for sleep disturbance. Negative for hallucinations and suicidal ideas.        PHQ-9 Depression Screening  Little interest or pleasure in doing things? 3-->nearly every day   Feeling down, depressed, or hopeless? 3-->nearly every day   Trouble falling or staying asleep, or sleeping too much? 3-->nearly every day   Feeling tired or having little energy? 3-->nearly every day   Poor appetite or overeating? 2-->more than half the days   Feeling bad about yourself - or that you are a failure or have let yourself or your family down? 3-->nearly every day   Trouble concentrating on things, such as reading the newspaper or watching television? 3-->nearly every day   Moving or speaking so slowly that other people could have noticed? Or the opposite - being so fidgety or restless that you have been moving around a lot more than usual? 2-->more than half the days   Thoughts that you would be better off dead, or of hurting yourself in some way? 0-->not at all   PHQ-9 Total Score 22   If you checked off any problems, how difficult have these problems made it for you to do your work, take care of things at home, or get along with other people? extremely difficult     MALACHI-7      Over the last two weeks, how often have you been bothered by the following problems?  Feeling nervous, anxious or on edge: More than half the days  Not being able to stop or control worrying: More than half the days  Worrying too much about different things: More than half the days  Trouble Relaxing: Several days  Being so restless that it is hard to sit still:  "Several days  Becoming easily annoyed or irritable: Several days  Feeling afraid as if something awful might happen: More than half the days  MALACHI 7 Total Score: 11  If you checked any problems, how difficult have these problems made it for you to do your work, take care of things at home, or get along with other people: Very difficult    Patient History:   The following portions of the patient's history were reviewed and updated as appropriate: allergies, current medications, past family history, past medical history, past social history, past surgical history and problem list.     Social History     Socioeconomic History    Marital status:    Tobacco Use    Smoking status: Every Day     Current packs/day: 1.50     Average packs/day: 1.5 packs/day for 40.0 years (60.0 ttl pk-yrs)     Types: Cigarettes    Smokeless tobacco: Never   Vaping Use    Vaping status: Never Used   Substance and Sexual Activity    Alcohol use: No    Drug use: Not Currently     Comment: previously used kratom    Sexual activity: Defer       Medications:     Current Outpatient Medications:     BIOTIN MAXIMUM PO, Take  by mouth., Disp: , Rfl:     FLUoxetine (PROzac) 20 MG capsule, Take 2 capsules by mouth 2 (Two) Times a Day., Disp: , Rfl:     gabapentin (NEURONTIN) 100 MG capsule, Take 1 capsule by mouth 3 (Three) Times a Day., Disp: , Rfl:     traMADol (ULTRAM) 50 MG tablet, Take 1 tablet by mouth Daily As Needed for Moderate Pain., Disp: , Rfl:     HYDROcodone-homatropine (HYCODAN) 5-1.5 MG/5ML syrup, Take 5 mL by mouth Every 6 (Six) Hours As Needed for Cough. (Patient not taking: Reported on 7/8/2024), Disp: 75 mL, Rfl: 0    Multiple Vitamins-Minerals (WOMENS MULTI PO), Take  by mouth. (Patient not taking: Reported on 7/29/2024), Disp: , Rfl:     Objective     Physical Exam:  Vital Signs:   Vitals:    07/29/24 1255   BP: 129/80   Pulse: 70   SpO2: 97%   Weight: 52.2 kg (115 lb)   Height: 167.6 cm (66\")     Body mass index is 18.56 " "kg/m².     Mental Status Exam:   MENTAL STATUS EXAM   General Appearance:  Cleanly groomed and dressed  Eye Contact:  Good eye contact  Attitude:  Cooperative  Motor Activity:  Normal gait, posture  Muscle Strength:  Normal  Speech:  Normal rate, tone, volume  Language:  Spontaneous  Mood and affect:  Depressed  Thought Process:  Logical  Associations/ Thought Content:  No delusions  Hallucinations:  None  Suicidal Ideations:  Not present  Homicidal Ideation:  Not present  Sensorium:  Alert and clear  Orientation:  Person, place, time and situation  Attention Span/ Concentration:  Good  Fund of Knowledge:  Appropriate for age and educational level  Intellectual Functioning:  Average range  Insight:  Good  Judgement:  Good  Reliability:  Good  Impulse Control:  Good       @RESULASTCBCDIFFPANEL,TSH,LABLIPI,ESOWEZMO24,ONRPLHAJ23,MG,FOLATE,PROLACTIN,CRPRESULT,CMP,[id number])@    Lab Results   Component Value Date    GLUCOSE 112 (H) 05/13/2020    BUN 5 (L) 05/13/2020    CREATININE 0.70 05/13/2020    EGFR >60 05/24/2015    BCR 7.1 05/13/2020    K 4.0 05/13/2020    CO2 24.7 05/13/2020    CALCIUM 8.8 05/13/2020    ALBUMIN 4.20 05/13/2020    BILITOT 0.4 05/13/2020    AST 15 05/13/2020    ALT 13 05/13/2020       Lab Results   Component Value Date    WBC 6.62 05/13/2020    HGB 14.6 05/13/2020    HCT 45.6 05/13/2020    MCV 95.0 05/13/2020     05/13/2020       No results found for: \"CHOL\", \"CHLPL\", \"TRIG\", \"HDL\", \"LDL\"               Assessment / Plan      Assessment:  Patient is a 57 yo female with a history of MDD, MALACHI, and ADHD (no formal testing completed). During pt's last appointment her primary complaint was depression/anxiety that has worsened since her son passed away in March of 2024. Her PCP increased her Prozac to 80mg daily 2-3 weeks prior to her first appointment with this provider. During last appointment we decided to wait another 3 weeks to see full effect of the dose increase before making anymore " changes.  Patient has now been on the Prozac 80 mg daily for about 6 weeks now and still feels that her depression/anxiety has not improved at all.  States that in the past Prozac has been beneficial to her but states that she feels it has stopped working and is wishing to switch medications.  Will taper patient off Prozac and start Viibryd to better help with anxiety and depressive symptoms.  Taper schedule listed below.  The taper listed is very conservative and slow as patient is very anxious about changing medications and has had difficulty with cross taper in the past.  Encouraged patient to start therapy, however, patient states that she is not ready for therapy yet and wishes to change medications before initiating therapy.  Educated patient on the importance of therapy and that it is a huge component of this treatment plan.  Will see patient in 1 month.    Taper schedule:   Day 1-3: Take Prozac 60mg daily and viibyrd 10mg daily.    Day 4-6: Take Prozac 40mg daily and Viibyrd 10mg daily.   Day 7-9: Take Prozac 20mg daily and Viibryd 10mg daily.   Day 10: Stop Prozac and continue Viibyrd 10mg daily until next appointment.     Diagnoses and all orders for this visit:    1. MALACHI (generalized anxiety disorder) (Primary)    2. MDD (major depressive disorder), recurrent episode, moderate       Differential:   Bipolar disorder-patient has never had a manic/hypomanic episode.    Plan:   Taper off Prozac and start Viibryd 10 mg daily.  Discussed medication options and treatment plan of prescribed medication as well as the risks, benefits, and side effects   Encouraged pt to start supportive psychotherapy efforts and medications as indicated.  Educated pt on signs and symptoms of serotonin syndrome and notified pt to go to the ER if experiencing these symptoms.   Notified pt that antidepressants can sometimes cause worsening SI and to monitor for this.   Notified patient to let provider know if she is having any issues  with the taper.    Short-term goals: Continue to develop rapport with patient.    Long-term goals: See symptom reduction with medication and therapy.     Weakness: Patient has never been in therapy.     Strengths: Great support from family.    Continue supportive psychotherapy efforts and medications as indicated. Treatment and medication options discussed during today's visit. Patient ackowledged and verbally consented to continue with current treatment plan and was educated on the importance of compliance with treatment and follow-up appointments. Patient seems reasonably able to adhere to treatment plan.      Medication Considerations:  Discussed medication options and treatment plan of prescribed medication(s) as well as the risks, benefits, and potential side effects. Patient is agreeable to call the office with any worsening of symptoms or onset of side effects. Patient is agreeable to call 911 or go to the nearest ER should he/she begin having SI/HI.    Quality Measures:   Educated patient on risk of tobacco use and encouraged patient to reduce her use.     Juan reviewed and is appropriate.    Follow Up:   Return in about 4 weeks (around 8/26/2024) for Recheck.    Copied text in this note has been reviewed and is accurate as of 7/29/2024.    Part of this note may be an electronic transcription/translation of spoken language to printed text using the Dragon Dictation System.    CASSANDRA Cruz, PMHNP-BC

## 2024-08-29 ENCOUNTER — OFFICE VISIT (OUTPATIENT)
Age: 59
End: 2024-08-29
Payer: COMMERCIAL

## 2024-08-29 VITALS
SYSTOLIC BLOOD PRESSURE: 150 MMHG | DIASTOLIC BLOOD PRESSURE: 84 MMHG | BODY MASS INDEX: 18.48 KG/M2 | HEIGHT: 66 IN | OXYGEN SATURATION: 98 % | WEIGHT: 115 LBS | HEART RATE: 70 BPM

## 2024-08-29 DIAGNOSIS — F33.1 MDD (MAJOR DEPRESSIVE DISORDER), RECURRENT EPISODE, MODERATE: Primary | ICD-10-CM

## 2024-08-29 DIAGNOSIS — F41.1 GAD (GENERALIZED ANXIETY DISORDER): ICD-10-CM

## 2024-08-29 RX ORDER — VILAZODONE HYDROCHLORIDE 20 MG/1
20 TABLET ORAL DAILY
Qty: 30 TABLET | Refills: 1 | Status: SHIPPED | OUTPATIENT
Start: 2024-08-29

## 2024-08-29 NOTE — PROGRESS NOTES
"     Office  Follow Up Visit      Patient Name: Olga Hansen  : 1965   MRN: [id number]     Referring Provider: Rosario Alaniz MD    Chief Complaint: \"Depression and anxiety\"    ICD-10-CM ICD-9-CM   1. MDD (major depressive disorder), recurrent episode, moderate  F33.1 296.32   2. MALACHI (generalized anxiety disorder)  F41.1 300.02      History of Present Illness:   Olga Hansen is a 58 y.o. female who is here today for follow up. Patient has a history of MDD, MALACHI, and ADHD (no formal testing completed).  Patient's anxiety and depressive symptoms worsened in 2024 when her son passed away.  Patient was seen for initial evaluation on 24.  Patient came to provider taking Prozac 80 mg daily (increased to 80 mg by PCP prior to initial evaluation).  Patient was continued on the 80 mg dosage in hopes that patient would see some relief in symptoms after a few weeks on that dosage.  During patient's last visit on 2024 patient had not noticed any difference in her symptoms with the increase Prozac dosage.  During patient's last appointment patient was tapered off Prozac and started on Viibryd 10 mg daily.    Today patient reports no difference in her anxiety and depressive symptoms.  Patient reports following depressive symptoms today: Feeling depressed, little interest in doing things that she finds pleasure in, feeling like a failure, difficulty concentrating, restlessness, and thoughts of death.  Patient reports that she has thoughts of wishing she would not have to live like this anymore.  Patient denies any intent or plan to act on these thoughts of death.  Patient adamantly denies SI today.  Patient has noticed that she is starting to drink more.  Reports to drink 3 drinks about 3 times a week.  Additionally, patient reports following symptoms of anxiety: Feeling anxious most days, worrying about multiple different things and having difficulty controlling the worry, restlessness, " trouble relaxing, and feeling as if something awful might happen. Reports to be sleeping about 6 hours each night but has difficulty staying asleep.  No changes in appetite.  States she is starting therapy soon.  Denies any side effects to Viibryd.    Subjective      Review of Systems:   Review of Systems   Constitutional:  Negative for appetite change.   Respiratory:  Negative for chest tightness and shortness of breath.    Gastrointestinal:  Negative for abdominal pain, constipation, diarrhea, nausea and vomiting.   Genitourinary:  Negative for difficulty urinating.   Neurological:  Negative for headaches.   Psychiatric/Behavioral:  Negative for hallucinations and suicidal ideas.        PHQ-9 Depression Screening  Little interest or pleasure in doing things? 3-->nearly every day   Feeling down, depressed, or hopeless? 3-->nearly every day   Trouble falling or staying asleep, or sleeping too much? 3-->nearly every day   Feeling tired or having little energy? 3-->nearly every day   Poor appetite or overeating? 2-->more than half the days   Feeling bad about yourself - or that you are a failure or have let yourself or your family down? 3-->nearly every day   Trouble concentrating on things, such as reading the newspaper or watching television? 3-->nearly every day   Moving or speaking so slowly that other people could have noticed? Or the opposite - being so fidgety or restless that you have been moving around a lot more than usual? 2-->more than half the days   Thoughts that you would be better off dead, or of hurting yourself in some way? 1-->several days   PHQ-9 Total Score 23   If you checked off any problems, how difficult have these problems made it for you to do your work, take care of things at home, or get along with other people? extremely difficult     MALACHI-7      Over the last two weeks, how often have you been bothered by the following problems?  Feeling nervous, anxious or on edge: More than half the  days  Not being able to stop or control worrying: Several days  Worrying too much about different things: Several days  Trouble Relaxing: More than half the days  Being so restless that it is hard to sit still: Several days  Becoming easily annoyed or irritable: Several days  Feeling afraid as if something awful might happen: More than half the days  MALACHI 7 Total Score: 10  If you checked any problems, how difficult have these problems made it for you to do your work, take care of things at home, or get along with other people: Somewhat difficult    Patient History:   The following portions of the patient's history were reviewed and updated as appropriate: allergies, current medications, past family history, past medical history, past social history, past surgical history and problem list.     Past Medical History:   Diagnosis Date    ADHD (attention deficit hyperactivity disorder)     Anxiety     Back pain     DDD (degenerative disc disease), cervical     MRSA (methicillin resistant staph aureus) culture positive     Neck pain     Vertigo 2017       Social History     Socioeconomic History    Marital status:    Tobacco Use    Smoking status: Every Day     Current packs/day: 1.50     Average packs/day: 1.5 packs/day for 40.0 years (60.0 ttl pk-yrs)     Types: Cigarettes    Smokeless tobacco: Never   Vaping Use    Vaping status: Never Used   Substance and Sexual Activity    Alcohol use: No    Drug use: Not Currently     Comment: previously used [employer]    Sexual activity: Defer     Medications:     Current Outpatient Medications:     gabapentin (NEURONTIN) 100 MG capsule, Take 1 capsule by mouth 3 (Three) Times a Day., Disp: , Rfl:     BIOTIN MAXIMUM PO, Take  by mouth. (Patient not taking: Reported on 8/29/2024), Disp: , Rfl:     HYDROcodone-homatropine (HYCODAN) 5-1.5 MG/5ML syrup, Take 5 mL by mouth Every 6 (Six) Hours As Needed for Cough. (Patient not taking: Reported on 7/8/2024), Disp: 75 mL, Rfl: 0     "Multiple Vitamins-Minerals (WOMENS MULTI PO), Take  by mouth. (Patient not taking: Reported on 7/29/2024), Disp: , Rfl:     traMADol (ULTRAM) 50 MG tablet, Take 1 tablet by mouth Daily As Needed for Moderate Pain. (Patient not taking: Reported on 8/29/2024), Disp: , Rfl:     vilazodone (Viibryd) 20 MG tablet tablet, Take 1 tablet by mouth Daily., Disp: 30 tablet, Rfl: 1    Objective     Physical Exam:  Vital Signs:   Vitals:    08/29/24 1309   BP: 150/84   Pulse: 70   SpO2: 98%   Weight: 52.2 kg (115 lb)   Height: 167.6 cm (66\")     Body mass index is 18.56 kg/m².     Mental Status Exam:   MENTAL STATUS EXAM   General Appearance:  Cleanly groomed and dressed  Eye Contact:  Good eye contact  Attitude:  Cooperative  Motor Activity:  Normal gait, posture  Muscle Strength:  Normal  Speech:  Normal rate, tone, volume  Language:  Spontaneous  Mood and affect:  Depressed  Hopelessness:  Denies  Loneliness: Denies  Thought Process:  Logical  Associations/ Thought Content:  No delusions  Hallucinations:  None  Suicidal Ideations:  Not present  Homicidal Ideation:  Not present  Sensorium:  Alert and clear  Orientation:  Person, place, time and situation  Attention Span/ Concentration:  Good  Fund of Knowledge:  Appropriate for age and educational level  Intellectual Functioning:  Average range  Insight:  Good  Judgement:  Good  Reliability:  Good  Impulse Control:  Good     @RESULASTCBCDIFFPANEL,TSH,LABLIPI,GISDSEET55,EQFGLEAW56,MG,FOLATE,PROLACTIN,CRPRESULT,CMP,[id number])@    Lab Results   Component Value Date    GLUCOSE 112 (H) 05/13/2020    BUN 5 (L) 05/13/2020    CREATININE 0.70 05/13/2020    EGFR >60 05/24/2015    BCR 7.1 05/13/2020    K 4.0 05/13/2020    CO2 24.7 05/13/2020    CALCIUM 8.8 05/13/2020    ALBUMIN 4.20 05/13/2020    BILITOT 0.4 05/13/2020    AST 15 05/13/2020    ALT 13 05/13/2020       Lab Results   Component Value Date    WBC 6.62 05/13/2020    HGB 14.6 05/13/2020    HCT 45.6 05/13/2020    MCV 95.0 " "05/13/2020     05/13/2020       No results found for: \"CHOL\", \"CHLPL\", \"TRIG\", \"HDL\", \"LDL\"               Assessment / Plan      Assessment:  Patient is a 58-year-old female with a history of MDD, MALACHI, and ADHD (no formal testing completed).  Patient's anxiety and depressive symptoms worsened in March 2024 when her son passed away.  Patient was seen for initial evaluation on 7/8/24.  Patient came to provider taking Prozac 80 mg daily (increased to 80 mg by PCP prior to initial evaluation).  Patient was continued on the 80 mg dosage in hopes that patient would see some relief in symptoms after a few weeks on that dosage.  During patient's last visit on 7/29/2024 patient had not noticed any difference in her symptoms with the increase Prozac dosage.  During patient's last appointment patient was tapered off Prozac and started on Viibryd 10 mg daily.  Patient reports no difference in her anxiety and depressive symptoms.  Patient's PHQ-9 and MALACHI-7 scores are about the same as last visit.  Will increase Viibryd to 20 mg daily.  Greatly encouraged patient to start therapy as this is 50% of the treatment plan.  Will see patient in 1 month.  Reviewed safety plan with patient.  Safety plan scanned into chart.    Diagnoses and all orders for this visit:    1. MDD (major depressive disorder), recurrent episode, moderate (Primary)  -     Ambulatory Referral to Psychology    2. MALACHI (generalized anxiety disorder)    Other orders  -     vilazodone (Viibryd) 20 MG tablet tablet; Take 1 tablet by mouth Daily.  Dispense: 30 tablet; Refill: 1       Differential:   Bipolar disorder-patient has never had a manic/hypomanic episode.     Plan:   Increase Viibyrd to 20 mg daily.    Discussed medication options and treatment plan of prescribed medication as well as the risks, benefits, and side effects   Encouraged pt to start supportive psychotherapy efforts and medications as indicated.  Educated pt on signs and symptoms of " serotonin syndrome and notified pt to go to the ER if experiencing these symptoms.   Notified pt that antidepressants can sometimes cause worsening SI and to monitor for this.   Notified patient to let provider know if she is having any issues with the taper.     Short-term goals: Continue to develop rapport with patient.     Long-term goals: See symptom reduction with medication and therapy.     Weakness: Patient has never been in therapy.     Strengths: Great support from family.    Continue supportive psychotherapy efforts and medications as indicated. Treatment and medication options discussed during today's visit. Patient ackowledged and verbally consented to continue with current treatment plan and was educated on the importance of compliance with treatment and follow-up appointments. Patient seems reasonably able to adhere to treatment plan.      Medication Considerations:  Discussed medication options and treatment plan of prescribed medication(s) as well as the risks, benefits, and potential side effects. Patient is agreeable to call the office with any worsening of symptoms or onset of side effects. Patient is agreeable to call 911 or go to the nearest ER should he/she begin having SI/HI.    Quality Measures:   Educated patient on risk of tobacco use and encouraged patient to reduce her use.      I advised Olga Hansen of the risks of tobacco use.     Juan reviewed and is appropriate.    Follow Up:   Return in about 4 weeks (around 9/26/2024) for Recheck.    Copied text in this note has been reviewed and is accurate as of 9/1/2024.    Part of this note may be an electronic transcription/translation of spoken language to printed text using the Dragon Dictation System.    CASSANDRA Cruz, PMHNP-BC

## 2024-09-04 ENCOUNTER — TRANSCRIBE ORDERS (OUTPATIENT)
Dept: MAMMOGRAPHY | Facility: HOSPITAL | Age: 59
End: 2024-09-04
Payer: COMMERCIAL

## 2024-09-04 DIAGNOSIS — Z12.31 ENCOUNTER FOR SCREENING MAMMOGRAM FOR BREAST CANCER: Primary | ICD-10-CM

## 2024-10-24 NOTE — TELEPHONE ENCOUNTER
Pt is having car issues. Is unable to make an appt. Tried walking pt through [employer] sign up. Having her son help her when she gets home.     Pt is completely out of medication.     Rx Refill Note  Requested Prescriptions     Pending Prescriptions Disp Refills    vilazodone (Viibryd) 20 MG tablet tablet 30 tablet 1     Sig: Take 1 tablet by mouth Daily.      Last office visit with prescribing clinician: Visit date not found 8/29/24  Last telemedicine visit with prescribing clinician: Visit date not found   Next office visit with prescribing clinician: Visit date not found     Obdulio Luz MA  10/24/24, 13:13 EDT

## 2024-10-24 NOTE — TELEPHONE ENCOUNTER
Can we please make another appointment with pt? I will provide a 30-day supply if another appointment is scheduled.

## 2024-10-25 RX ORDER — VILAZODONE HYDROCHLORIDE 20 MG/1
20 TABLET ORAL DAILY
Qty: 30 TABLET | Refills: 1 | Status: SHIPPED | OUTPATIENT
Start: 2024-10-25

## 2024-10-25 NOTE — TELEPHONE ENCOUNTER
Pt completely out of medications.    Rx Refill Note  Requested Prescriptions     Pending Prescriptions Disp Refills    vilazodone (Viibryd) 20 MG tablet tablet 30 tablet 1     Sig: Take 1 tablet by mouth Daily.      Last office visit with prescribing clinician: 8/29/2024   Last telemedicine visit with prescribing clinician: Visit date not found   Next office visit with prescribing clinician: 10/28/2024     Obdulio Luz MA  10/25/24, 13:22 EDT

## 2024-10-28 ENCOUNTER — TELEPHONE (OUTPATIENT)
Age: 59
End: 2024-10-28
Payer: COMMERCIAL

## 2024-10-28 RX ORDER — VILAZODONE HYDROCHLORIDE 20 MG/1
20 TABLET ORAL DAILY
Qty: 30 TABLET | Refills: 1 | OUTPATIENT
Start: 2024-10-28

## 2024-10-28 NOTE — TELEPHONE ENCOUNTER
Pt called. Could not figure out MyChart. Pt was extremely upset. Rescheduled for tomorrow for in person.

## 2024-10-28 NOTE — TELEPHONE ENCOUNTER
Called pt. Lvm to please call office back. Pt has not previously done MyChart visit before. Pt needs to login 15-20 minutes early to complete E-check in and PHQ9/GAD7. Remind pt that PHQ9/GAD7 will be located in messages

## 2024-10-30 ENCOUNTER — TELEMEDICINE (OUTPATIENT)
Age: 59
End: 2024-10-30
Payer: COMMERCIAL

## 2024-10-30 DIAGNOSIS — F43.23 ADJUSTMENT DISORDER WITH MIXED ANXIETY AND DEPRESSED MOOD: Primary | ICD-10-CM

## 2024-10-30 RX ORDER — HYDROXYZINE HYDROCHLORIDE 25 MG/1
25 TABLET, FILM COATED ORAL 3 TIMES DAILY PRN
Qty: 30 TABLET | Refills: 1 | Status: SHIPPED | OUTPATIENT
Start: 2024-10-30

## 2024-10-30 RX ORDER — VILAZODONE HYDROCHLORIDE 40 MG/1
40 TABLET ORAL DAILY
Qty: 30 TABLET | Refills: 1 | Status: SHIPPED | OUTPATIENT
Start: 2024-10-30 | End: 2024-12-29

## 2024-10-30 NOTE — PROGRESS NOTES
"     Office  Follow Up Visit      Patient Name: Olga Hansen  : 1965   MRN: [id number]     Referring Provider: Rosario Alaniz MD    This provider is located at [facility], [address] [place] [address], using a secure MyChart Video Visit through [employer]. Patient is being seen remotely via telehealth at their home address in [place], and stated they are in a secure environment for this session. The patient's condition being diagnosed/treated is appropriate for telemedicine. The provider identified herself as well as her credentials. The patient, and/or patients guardian, consent to be seen remotely, and when consent is given they understand that the consent allows for patient identifiable information to be sent to a third party as needed.   They may refuse to be seen remotely at any time. The electronic data is encrypted and password protected, and the patient and/or guardian has been advised of the potential risks to privacy not withstanding such measures.    Chief Complaint:  \"anxiety and depression\"     ICD-10-CM ICD-9-CM   1. Adjustment disorder with mixed anxiety and depressed mood  F43.23 309.28      History of Present Illness:   Olga Hansen is a 58 y.o. female who is here today for follow up. Patient has a history of MDD, MALACHI, and ADHD (no formal testing completed).  Patient's anxiety and depressive symptoms worsened in 2024 when her son passed away.  Patient was seen for initial evaluation on 24.  Patient came to provider taking Prozac 80 mg daily (increased to 80 mg by PCP prior to initial evaluation).  Patient was continued on the 80 mg dosage in hopes that patient would see some relief in symptoms after a few weeks on that dosage.  On 2024 patient had not noticed any difference in her symptoms with the increase Prozac dosage and patient was tapered off Prozac and started on Viibryd 10 mg daily.  During patient's last visit on " 8/29/2024, patient's Viibryd was increased to 20 mg daily.    Today patient reports to still feel very anxious and depressed.  States that she cannot function and has no motivation to do anything.  Patient reports to still be grieving her son's death that occurred in March.  Patient has not been to therapy yet.  Denies any side effects to Viibryd.  Reports she has not noticed too much of a difference in her anxiety and depressive symptoms with the increase in dosage.  Patient reports following symptoms of depression today: Feeling depressed nearly every day, little interest in doing things that she normally finds pleasure in, fatigue, difficulty sleeping, concentrating, and restlessness.  Patient also reports to feel anxious nearly every day.  Reports to feel anxious about multiple things and having difficulty controlling the worry.  Reports sleeping 6 to 7 hours each night.  States that she does wake up in the middle the night but is able to quickly go back to sleep.  No change in appetite.  Denies SI/HI/AVH.    Subjective      Review of Systems:   Review of Systems   Constitutional:  Negative for appetite change.   Respiratory:  Negative for chest tightness and shortness of breath.    Gastrointestinal:  Negative for abdominal pain, constipation, diarrhea, nausea and vomiting.   Genitourinary:  Negative for difficulty urinating.   Neurological:  Negative for headaches.   Psychiatric/Behavioral:  Negative for hallucinations and suicidal ideas. The patient is nervous/anxious.      PHQ-9 Depression Screening  Little interest or pleasure in doing things? Almost all   Feeling down, depressed, or hopeless? Almost all   PHQ-2 Total Score 6   Trouble falling or staying asleep, or sleeping too much? Almost all   Feeling tired or having little energy? Almost all   Poor appetite or overeating? Not at all   Feeling bad about yourself - or that you are a failure or have let yourself or your family down? Almost all   Trouble  concentrating on things, such as reading the newspaper or watching television? Almost all   Moving or speaking so slowly that other people could have noticed? Or the opposite - being so fidgety or restless that you have been moving around a lot more than usual? Almost all   Thoughts that you would be better off dead, or of hurting yourself in some way? Not at all   PHQ-9 Total Score 21   If you checked off any problems, how difficult have these problems made it for you to do your work, take care of things at home, or get along with other people? Very difficult     MALACHI-7      Over the last two weeks, how often have you been bothered by the following problems?  Feeling nervous, anxious or on edge: Nearly every day  Not being able to stop or control worrying: Nearly every day  Worrying too much about different things: Nearly every day  Trouble Relaxing: Nearly every day  Being so restless that it is hard to sit still: Nearly every day  Becoming easily annoyed or irritable: Several days  Feeling afraid as if something awful might happen: Nearly every day  MALACHI 7 Total Score: 19  If you checked any problems, how difficult have these problems made it for you to do your work, take care of things at home, or get along with other people: Somewhat difficult    Patient History:   The following portions of the patient's history were reviewed and updated as appropriate: allergies, current medications, past family history, past medical history, past social history, past surgical history and problem list.     Social History     Socioeconomic History    Marital status:    Tobacco Use    Smoking status: Every Day     Current packs/day: 1.50     Average packs/day: 1.5 packs/day for 40.0 years (60.0 ttl pk-yrs)     Types: Cigarettes    Smokeless tobacco: Never   Vaping Use    Vaping status: Never Used   Substance and Sexual Activity    Alcohol use: No    Drug use: Not Currently     Comment: previously used kratom    Sexual  activity: Defer     Medications:     Current Outpatient Medications:     BIOTIN MAXIMUM PO, Take  by mouth. (Patient not taking: Reported on 8/29/2024), Disp: , Rfl:     gabapentin (NEURONTIN) 100 MG capsule, Take 1 capsule by mouth 3 (Three) Times a Day., Disp: , Rfl:     HYDROcodone-homatropine (HYCODAN) 5-1.5 MG/5ML syrup, Take 5 mL by mouth Every 6 (Six) Hours As Needed for Cough. (Patient not taking: Reported on 7/8/2024), Disp: 75 mL, Rfl: 0    hydrOXYzine (ATARAX) 25 MG tablet, Take 1 tablet by mouth 3 (Three) Times a Day As Needed for Anxiety., Disp: 30 tablet, Rfl: 1    Multiple Vitamins-Minerals (WOMENS MULTI PO), Take  by mouth. (Patient not taking: Reported on 7/29/2024), Disp: , Rfl:     traMADol (ULTRAM) 50 MG tablet, Take 1 tablet by mouth Daily As Needed for Moderate Pain. (Patient not taking: Reported on 8/29/2024), Disp: , Rfl:     vilazodone (VIIBRYD) 40 MG tablet tablet, Take 1 tablet by mouth Daily for 60 days., Disp: 30 tablet, Rfl: 1    Objective     Physical Exam:  Vital Signs: There were no vitals filed for this visit.  There is no height or weight on file to calculate BMI.     Mental Status Exam:   MENTAL STATUS EXAM   General Appearance:  Cleanly groomed and dressed  Eye Contact:  Good eye contact  Attitude:  Cooperative  Motor Activity:  Normal gait, posture  Speech:  Normal rate, tone, volume  Language:  Spontaneous  Mood and affect:  Depressed  Thought Process:  Logical  Associations/ Thought Content:  No delusions  Hallucinations:  None  Suicidal Ideations:  Not present  Homicidal Ideation:  Not present  Sensorium:  Alert and clear  Orientation:  Person and place  Attention Span/ Concentration:  Good  Fund of Knowledge:  Appropriate for age and educational level  Intellectual Functioning:  Average range  Insight:  Good  Judgement:  Good  Reliability:  Good  Impulse Control:  Good    "    @RESULASTCBCDIFFPANEL,TSH,LABLIPI,GYLGNWBE07,AWJUKPHJ73,MG,FOLATE,PROLACTIN,CRPRESULT,CMP,[id number])@    Lab Results   Component Value Date    GLUCOSE 112 (H) 05/13/2020    BUN 5 (L) 05/13/2020    CREATININE 0.70 05/13/2020    EGFR >60 05/24/2015    BCR 7.1 05/13/2020    K 4.0 05/13/2020    CO2 24.7 05/13/2020    CALCIUM 8.8 05/13/2020    ALBUMIN 4.20 05/13/2020    BILITOT 0.4 05/13/2020    AST 15 05/13/2020    ALT 13 05/13/2020       Lab Results   Component Value Date    WBC 6.62 05/13/2020    HGB 14.6 05/13/2020    HCT 45.6 05/13/2020    MCV 95.0 05/13/2020     05/13/2020       No results found for: \"CHOL\", \"CHLPL\", \"TRIG\", \"HDL\", \"LDL\"               Assessment / Plan      Assessment:  Patient is a 14-year-old female with a history of MDD, MALACHI, and ADHD (no formal testing completed).  Patient's anxiety and depressive symptoms worsened in March 2024 when her son passed away.  Patient was seen for initial evaluation on 7/8/24.  Patient came to provider taking Prozac 80 mg daily (increased to 80 mg by PCP prior to initial evaluation).  Patient was continued on the 80 mg dosage in hopes that patient would see some relief in symptoms after a few weeks on that dosage.  On 7/29/2024 patient had not noticed any difference in her symptoms with the increase Prozac dosage and patient was tapered off Prozac and started on Viibryd 10 mg daily.  During patient's last visit on 8/29/2024, patient's Viibryd was increased to 20 mg daily.  Today patient reports to still continue to feel depressed and anxious.  Patient has not noticed much improvement since increasing the Viibryd to 20 mg daily.  Patient reports that she has not started therapy yet and feels that she has not grieved her son's death.  Notified patient that therapy is 50% of his treatment plan and that she needs to be starting therapy.  Will increase Viibryd to 40 mg daily to better help patient's anxiety and depressive symptoms.  Will see patient in 1 " month.  Will add hydroxyzine 25 mg 3 times daily as needed for heightened anxiety.    Diagnoses and all orders for this visit:    1. Adjustment disorder with mixed anxiety and depressed mood (Primary)  -     Ambulatory Referral to Psychology    Other orders  -     vilazodone (VIIBRYD) 40 MG tablet tablet; Take 1 tablet by mouth Daily for 60 days.  Dispense: 30 tablet; Refill: 1  -     hydrOXYzine (ATARAX) 25 MG tablet; Take 1 tablet by mouth 3 (Three) Times a Day As Needed for Anxiety.  Dispense: 30 tablet; Refill: 1      Differential:   Bipolar disorder-patient has never had a manic/hypomanic episode.     Plan:   Increase Viibyrd to 40 mg daily.    Will add hydroxyzine 25 mg 3 times daily as needed for heightened anxiety.  Discussed medication options and treatment plan of prescribed medication as well as the risks, benefits, and side effects   Encouraged pt to start supportive psychotherapy efforts and medications as indicated.  Educated pt on signs and symptoms of serotonin syndrome and notified pt to go to the ER if experiencing these symptoms.   Notified pt that antidepressants can sometimes cause worsening SI and to monitor for this.   Notified patient to let provider know if she is having any issues with the taper.     Short-term goals: Continue to develop rapport with patient.     Long-term goals: See symptom reduction with medication and therapy.     Weakness: Patient has never been in therapy.     Strengths: Great support from family.    Continue supportive psychotherapy efforts and medications as indicated. Treatment and medication options discussed during today's visit. Patient ackowledged and verbally consented to continue with current treatment plan and was educated on the importance of compliance with treatment and follow-up appointments. Patient seems reasonably able to adhere to treatment plan.      Medication Considerations:  Discussed medication options and treatment plan of prescribed  medication(s) as well as the risks, benefits, and potential side effects. Patient is agreeable to call the office with any worsening of symptoms or onset of side effects. Patient is agreeable to call 911 or go to the nearest ER should he/she begin having SI/HI.    Quality Measures:   Educated patient on risk of tobacco use and encouraged patient to reduce her use.     Juan reviewed and is appropriate.    Follow Up:   Return in about 4 weeks (around 11/27/2024) for Recheck.    Copied text in this note has been reviewed and is accurate as of 10/30/2024.    Part of this note may be an electronic transcription/translation of spoken language to printed text using the Dragon Dictation System.    CASSANDRA Cruz, PMHNP-BC

## 2024-11-05 ENCOUNTER — TELEPHONE (OUTPATIENT)
Age: 59
End: 2024-11-05
Payer: COMMERCIAL

## 2024-11-05 NOTE — TELEPHONE ENCOUNTER
Can you please call pt and let her know that her mood needs to be treated and prioritized over her ADHD symptoms. If we do start pt's stimulant I am afraid that her anxiety will worsen. Discussed with pt that she needs to start therapy during her last visit. Can you please let pt know that we can discuss this at her next appointment.

## 2024-11-11 ENCOUNTER — TELEPHONE (OUTPATIENT)
Age: 59
End: 2024-11-11
Payer: COMMERCIAL

## 2024-11-11 NOTE — TELEPHONE ENCOUNTER
Called pt to schedule with the virtual clinic because they could not reach her. Gave the clinic's phone number to her.

## 2024-12-10 ENCOUNTER — TRANSCRIBE ORDERS (OUTPATIENT)
Dept: ADMINISTRATIVE | Facility: HOSPITAL | Age: 59
End: 2024-12-10
Payer: COMMERCIAL

## 2024-12-10 ENCOUNTER — HOSPITAL ENCOUNTER (OUTPATIENT)
Dept: GENERAL RADIOLOGY | Facility: HOSPITAL | Age: 59
Discharge: HOME | End: 2024-12-10
Admitting: FAMILY MEDICINE
Payer: COMMERCIAL

## 2024-12-10 DIAGNOSIS — J40 BRONCHITIS: ICD-10-CM

## 2024-12-10 DIAGNOSIS — J40 BRONCHITIS: Primary | ICD-10-CM

## 2024-12-10 PROCEDURE — 71046 X-RAY EXAM CHEST 2 VIEWS: CPT

## 2024-12-11 ENCOUNTER — TELEMEDICINE (OUTPATIENT)
Age: 59
End: 2024-12-11
Payer: COMMERCIAL

## 2024-12-11 DIAGNOSIS — F90.0 ADHD (ATTENTION DEFICIT HYPERACTIVITY DISORDER), INATTENTIVE TYPE: ICD-10-CM

## 2024-12-11 DIAGNOSIS — F43.23 ADJUSTMENT DISORDER WITH MIXED ANXIETY AND DEPRESSED MOOD: Primary | ICD-10-CM

## 2024-12-11 RX ORDER — VILAZODONE HYDROCHLORIDE 40 MG/1
40 TABLET ORAL DAILY
Qty: 30 TABLET | Refills: 1 | Status: SHIPPED | OUTPATIENT
Start: 2024-12-11 | End: 2025-02-09

## 2024-12-11 RX ORDER — VILAZODONE HYDROCHLORIDE 40 MG/1
40 TABLET ORAL DAILY
Qty: 30 TABLET | Refills: 1 | Status: CANCELLED | OUTPATIENT
Start: 2024-12-11 | End: 2025-02-09

## 2024-12-11 NOTE — PROGRESS NOTES
"     Office  Follow Up Visit      Patient Name: Olga Hansen  : 1965   MRN: [id number]     Referring Provider: Rosario Alaniz MD    This provider is located at [facility], [address] [place] [address], using a secure MyChart Video Visit through [employer]. Patient is being seen remotely via telehealth at their home address in [place], and stated they are in a secure environment for this session. The patient's condition being diagnosed/treated is appropriate for telemedicine. The provider identified herself as well as her credentials. The patient, and/or patients guardian, consent to be seen remotely, and when consent is given they understand that the consent allows for patient identifiable information to be sent to a third party as needed.   They may refuse to be seen remotely at any time. The electronic data is encrypted and password protected, and the patient and/or guardian has been advised of the potential risks to privacy not withstanding such measures.    Mode of Visit: Video  Location of patient: -HOME-  Location of provider: +[facility]+  You have chosen to receive care through a telehealth visit.  The patient has signed the video visit consent form.  The visit included audio and video interaction. No technical issues occurred during this visit.    Chief Complaint:  \"anxiety and depression\"     ICD-10-CM ICD-9-CM   1. Adjustment disorder with mixed anxiety and depressed mood  F43.23 309.28   2. ADHD (attention deficit hyperactivity disorder), inattentive type  F90.0 314.00      History of Present Illness:   Olga Hansen is a 59 y.o. female who is here today for follow up. Patient has a history of MDD, MALACHI, and ADHD (no formal testing completed).  Patient's anxiety and depressive symptoms worsened in 2024 when her son passed away.  Patient was seen for initial evaluation on 24.  Patient came to provider taking Prozac 80 mg daily " (increased to 80 mg by PCP prior to initial evaluation).  Patient was continued on the 80 mg dosage in hopes that patient would see some relief in symptoms after a few weeks on that dosage.  On 7/29/2024 patient had not noticed any difference in her symptoms with the increase Prozac dosage and patient was tapered off Prozac and started on Viibryd 10 mg daily.  Over the past couple of visits patient's Viibryd has been increased to 40 mg daily.    Today patient reports that she is still having difficulty with anxiety and depressive symptoms but that they have improved some since increasing the Viibryd.  Patient states that she is no longer crying as much and has been able to control her anxiety better.  Reports to still feel depressed every day.  Patient feels that she is still not grieved her son's death.  Patient is wishing to restart her Vyvanse.  Pt is experiencing the following inattentive symptoms: difficulty sustaining attention, does not seem to listen when spoken to directly, does not follow through on instructions and fails to finish responsibilities , difficulty organizing tasks and activities, often avoids tasks that require sustained mental effort, and easily distracted. Pt reports the following hyperactive/impulsive symptoms: often fidgets or squirms in seat and often unable to engage in leisure activities quietly. These symptoms have caused impairment in important areas of daily functioning.  States that she has had these problems since childhood.  Reports that she was on Vyvanse from 2149-2049 but was taken off the medication because her psychiatrist's office closed and never found a new provider.    Reports to be sleeping 6 to 7 hours each night.  No change in appetite.  Denies SI/HI/AVH.  Patient states she is starting therapy in a couple weeks.  Denies any side effects to her current medications.    Subjective      Review of Systems:   Review of Systems   Constitutional:  Negative for appetite  change.   Respiratory:  Negative for chest tightness and shortness of breath.    Gastrointestinal:  Negative for abdominal pain, constipation, diarrhea, nausea and vomiting.   Genitourinary:  Negative for difficulty urinating.   Neurological:  Negative for headaches.   Psychiatric/Behavioral:  Negative for hallucinations and suicidal ideas. The patient is nervous/anxious.        PHQ-9 Depression Screening  Little interest or pleasure in doing things? Not at all   Feeling down, depressed, or hopeless? Almost all   PHQ-2 Total Score 3   Trouble falling or staying asleep, or sleeping too much? Several days   Feeling tired or having little energy? Almost all   Poor appetite or overeating? Not at all   Feeling bad about yourself - or that you are a failure or have let yourself or your family down? Almost all   Trouble concentrating on things, such as reading the newspaper or watching television? Almost all   Moving or speaking so slowly that other people could have noticed? Or the opposite - being so fidgety or restless that you have been moving around a lot more than usual? Almost all   Thoughts that you would be better off dead, or of hurting yourself in some way? Not at all   PHQ-9 Total Score 16   If you checked off any problems, how difficult have these problems made it for you to do your work, take care of things at home, or get along with other people? Very difficult     MALACHI-7      Over the last two weeks, how often have you been bothered by the following problems?  Feeling nervous, anxious or on edge: Nearly every day  Not being able to stop or control worrying: Several days  Worrying too much about different things: Several days  Trouble Relaxing: More than half the days  Being so restless that it is hard to sit still: Several days  Becoming easily annoyed or irritable: Not at all  Feeling afraid as if something awful might happen: Nearly every day  MALACHI 7 Total Score: 11  If you checked any problems, how  difficult have these problems made it for you to do your work, take care of things at home, or get along with other people: Somewhat difficult    Patient History:   The following portions of the patient's history were reviewed and updated as appropriate: allergies, current medications, past family history, past medical history, past social history, past surgical history and problem list.     Social History     Socioeconomic History    Marital status:    Tobacco Use    Smoking status: Every Day     Current packs/day: 1.50     Average packs/day: 1.5 packs/day for 40.0 years (60.0 ttl pk-yrs)     Types: Cigarettes    Smokeless tobacco: Never   Vaping Use    Vaping status: Never Used   Substance and Sexual Activity    Alcohol use: No    Drug use: Not Currently     Comment: previously used kratom    Sexual activity: Defer     Medications:     Current Outpatient Medications:     vilazodone (VIIBRYD) 40 MG tablet tablet, Take 1 tablet by mouth Daily for 60 days., Disp: 30 tablet, Rfl: 1    BIOTIN MAXIMUM PO, Take  by mouth. (Patient not taking: Reported on 8/29/2024), Disp: , Rfl:     gabapentin (NEURONTIN) 100 MG capsule, Take 1 capsule by mouth 3 (Three) Times a Day., Disp: , Rfl:     HYDROcodone-homatropine (HYCODAN) 5-1.5 MG/5ML syrup, Take 5 mL by mouth Every 6 (Six) Hours As Needed for Cough. (Patient not taking: Reported on 7/8/2024), Disp: 75 mL, Rfl: 0    Multiple Vitamins-Minerals (WOMENS MULTI PO), Take  by mouth. (Patient not taking: Reported on 7/29/2024), Disp: , Rfl:     traMADol (ULTRAM) 50 MG tablet, Take 1 tablet by mouth Daily As Needed for Moderate Pain. (Patient not taking: Reported on 8/29/2024), Disp: , Rfl:     Objective     Physical Exam:  Vital Signs: There were no vitals filed for this visit.  There is no height or weight on file to calculate BMI.     Mental Status Exam:   MENTAL STATUS EXAM   General Appearance:  Cleanly groomed and dressed  Eye Contact:  Good eye contact  Attitude:   "Cooperative  Motor Activity:  Normal gait, posture  Muscle Strength:  Normal  Speech:  Normal rate, tone, volume  Language:  Spontaneous  Mood and affect:  Anxious and depressed  Hopelessness:  Denies  Loneliness: Denies  Thought Process:  Logical  Associations/ Thought Content:  No delusions  Hallucinations:  None  Suicidal Ideations:  Not present  Homicidal Ideation:  Not present  Sensorium:  Alert and clear  Orientation:  Person, place, time and situation  Attention Span/ Concentration:  Good  Fund of Knowledge:  Appropriate for age and educational level  Intellectual Functioning:  Average range  Insight:  Good  Judgement:  Good  Reliability:  Good  Impulse Control:  Good       @RESULASTCBCDIFFPANEL,TSH,LABLIPI,IQZNMNAF41,YFYNMZRB56,MG,FOLATE,PROLACTIN,CRPRESULT,CMP,[id number])@    Lab Results   Component Value Date    GLUCOSE 112 (H) 05/13/2020    BUN 5 (L) 05/13/2020    CREATININE 0.70 05/13/2020    EGFR >60 05/24/2015    BCR 7.1 05/13/2020    K 4.0 05/13/2020    CO2 24.7 05/13/2020    CALCIUM 8.8 05/13/2020    ALBUMIN 4.20 05/13/2020    BILITOT 0.4 05/13/2020    AST 15 05/13/2020    ALT 13 05/13/2020       Lab Results   Component Value Date    WBC 6.62 05/13/2020    HGB 14.6 05/13/2020    HCT 45.6 05/13/2020    MCV 95.0 05/13/2020     05/13/2020       No results found for: \"CHOL\", \"CHLPL\", \"TRIG\", \"HDL\", \"LDL\"                  12/11/24 0800   Screening for Adults With ADHD - (1-6)   1. How often do you have trouble wrapping up the final details of a project, once the challenging parts have been done? Very Often   2. How often do you have difficulty getting things in order when you have to do a task that requires organization? Very Often   3. How often do you have problems remembering appointments or obligations  Sometimes   4. When you have a task that requires a lot of thought, how often do you avoid or delay getting started ? Very Often   5. How often do you fidget or squirm with your hands or feet " when you have to sit down for a long time? Often   6. How often do you feel overly active and compelled to do things, like you were driven by a motor? Never   7. How often do you make careless mistakes when you have to work on a boring or difficult project? Rarely   8. How often do have difficulty keeping your attention when you are doing boring or repetitive work? Sometimes   9. How often do you have difficulty concentrating on what people say to you, even when they are speaking to you Very Often   10.How often do you misplace or have difficulty finding things at home or at work? Sometimes   11.How often are you distracted by activity or noise around you? Very Often   12.How often do you leave your seat in meetings or other situations in which you are expected to remain seated? Sometimes   13.How often do you feel restless or fidgety? Often   14.How often do you have difficulty unwinding and relaxing when you have time to yourself? Very Often   15.How often do you find yourself talking too much when you are in social situations? Rarely   16.When you’re in a conversation, how often do you find yourself finishing the sentences of the people you are talking to, before they can finish them themselves? Often   17.How often do you have difficulty waiting your turn in situations when turn taking is required? Rarely   18.How often do you interrupt others when they are busy? Rarely       Assessment / Plan      Assessment:   Patient is a 59-year-old female.  Patient has a history of MDD, MALACHI, and ADHD (no formal testing completed).  Patient's anxiety and depressive symptoms worsened in March 2024 when her son passed away.  Patient was seen for initial evaluation on 7/8/24.  Patient came to provider taking Prozac 80 mg daily (increased to 80 mg by PCP prior to initial evaluation).  Patient was continued on the 80 mg dosage in hopes that patient would see some relief in symptoms after a few weeks on that dosage.  On 7/29/2024  patient had not noticed any difference in her symptoms with the increase Prozac dosage and patient was tapered off Prozac and started on Viibryd 10 mg daily.  Over the past couple of visits patient's Viibryd has been increased to 40 mg daily.  Patient reports some improvement in her anxiety depressive symptoms with the increased dose of Viibryd but still reports difficulty with anxiety and depression.  Patient states that she feels that she has not truly grieved her son's death.  Discussed with patient that grief therapy is a large portion of this treatment plan.  Discussed with patient that I do not believe a med change is what patient is needing right now, so Viibryd 40 mg daily will be continued.  Patient starts therapy in the next couple weeks.  Discussed that IOP is an option for patient if she feels that she needs that the patient declines.  Patient discussed restarting her her Vyvanse.  I do believe that patient has ADHD, predominantly inattentive type.  Discussed with patient that given that she is over the age of 58 I would need an EKG prior to starting the medication and a urine drug screen.  Patient stated that she did not want to go through all that and that she wants to get her Vyvanse to her primary care.  Discussed with patient risks versus benefits of being on a stimulant at her age.  Educated patient on cardiovascular risks of being on a stimulant at her age.  Notified patient that her last blood pressure taken at the clinic was elevated and that this may need to be either retaken or treated prior to starting Vyvanse.  Will see patient in 4 weeks.    Diagnoses and all orders for this visit:    1. Adjustment disorder with mixed anxiety and depressed mood (Primary)    2. ADHD (attention deficit hyperactivity disorder), inattentive type    Other orders  -     vilazodone (VIIBRYD) 40 MG tablet tablet; Take 1 tablet by mouth Daily for 60 days.  Dispense: 30 tablet; Refill: 1       Differential:   Bipolar  disorder-patient has never had a manic/hypomanic episode.     Plan:   Continue Viibyrd to 40 mg daily.    Have patient start therapy.  Discussed medication options and treatment plan of prescribed medication as well as the risks, benefits, and side effects   Encouraged pt to start supportive psychotherapy efforts and medications as indicated.  Educated pt on signs and symptoms of serotonin syndrome and notified pt to go to the ER if experiencing these symptoms.   Notified pt that antidepressants can sometimes cause worsening SI and to monitor for this.   Notified patient to let provider know if she is having any issues with the taper.     Short-term goals: Continue to develop rapport with patient.     Long-term goals: See symptom reduction with medication and therapy.     Weakness: Patient has never been in therapy.     Strengths: Great support from family.    Continue supportive psychotherapy efforts and medications as indicated. Treatment and medication options discussed during today's visit. Patient ackowledged and verbally consented to continue with current treatment plan and was educated on the importance of compliance with treatment and follow-up appointments. Patient seems reasonably able to adhere to treatment plan.      Medication Considerations:  Discussed medication options and treatment plan of prescribed medication(s) as well as the risks, benefits, and potential side effects. Patient is agreeable to call the office with any worsening of symptoms or onset of side effects. Patient is agreeable to call 911 or go to the nearest ER should he/she begin having SI/HI.    Quality Measures:   Educated patient on risk of tobacco use and encouraged patient to reduce her use.     Juan reviewed and is appropriate.    Follow Up:   Return in about 4 weeks (around 1/8/2025) for Recheck.    Copied text in this note has been reviewed and is accurate as of 12/11/2024.    Part of this note may be an electronic  transcription/translation of spoken language to printed text using the Dragon Dictation System.    CASSANDRA Cruz, PMHNP-BC

## 2025-01-03 ENCOUNTER — TELEPHONE (OUTPATIENT)
Age: 60
End: 2025-01-03
Payer: COMMERCIAL

## 2025-01-03 NOTE — TELEPHONE ENCOUNTER
Recommended artificial tears to use: 1 drop 4x a day in both eyes. Refill needed by 1/11/25 25 mg Hydroxyzine

## 2025-01-06 ENCOUNTER — TELEMEDICINE (OUTPATIENT)
Dept: PSYCHIATRY | Facility: CLINIC | Age: 60
End: 2025-01-06
Payer: COMMERCIAL

## 2025-01-06 DIAGNOSIS — F41.1 GAD (GENERALIZED ANXIETY DISORDER): ICD-10-CM

## 2025-01-06 DIAGNOSIS — F33.2 SEVERE EPISODE OF RECURRENT MAJOR DEPRESSIVE DISORDER, WITHOUT PSYCHOTIC FEATURES: Primary | ICD-10-CM

## 2025-01-06 DIAGNOSIS — F43.29 GRIEF REACTION WITH PROLONGED BEREAVEMENT: ICD-10-CM
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
## 2025-01-07 ENCOUNTER — TELEPHONE (OUTPATIENT)
Dept: PSYCHIATRY | Facility: CLINIC | Age: 60
End: 2025-01-07
Payer: COMMERCIAL

## 2025-01-07 NOTE — TELEPHONE ENCOUNTER
Left patient a voice mail to return my call in regards scheduling follow up appointments with Christie Noyola.

## 2025-01-07 NOTE — PATIENT INSTRUCTIONS
[place] warm Line: Phone number: [phone]      Monday through Friday 10 AM to 9 PM and Saturdays 5 PM to 9 PM.      A warmline is a NON-CRISIS number to call to get emotional support. You can call to have a conversation with someone who can provide support during hard times. Warmlines are staffed by trained peers who have been through their own mental health struggles and know what it's like to need help.      -Suicide hotline number: 988      -[facility] Resource Connection      The resource line is dedicated to providing behavioral health resources to residents of [place] and [facility], seven days a week, 7 a.m.-7 p.m.      [phone]      [email or web address]  [facility]      Should you ever need assistance or just want to reach out to someone when your behavioral health provider is not available due to the office being closed you can contact [email or web address].       -Just text HOME to 720142 and someone will reach out to you within a few minutes.  This is a 24/7 help line and they are open even on holidays.

## 2025-01-13 ENCOUNTER — TELEPHONE (OUTPATIENT)
Age: 60
End: 2025-01-13
Payer: COMMERCIAL

## 2025-01-15 ENCOUNTER — TELEPHONE (OUTPATIENT)
Age: 60
End: 2025-01-15
Payer: COMMERCIAL

## 2025-01-16 ENCOUNTER — TELEPHONE (OUTPATIENT)
Age: 60
End: 2025-01-16
Payer: COMMERCIAL

## 2025-01-16 NOTE — TELEPHONE ENCOUNTER
Patient left LIO returned the call and made her an appt with Keisha. She wants to change medications will discuss with Keisha at her appt 1/17/25

## 2025-01-17 ENCOUNTER — TELEPHONE (OUTPATIENT)
Age: 60
End: 2025-01-17

## 2025-01-17 RX ORDER — VILAZODONE HYDROCHLORIDE 10 MG/1
TABLET ORAL
Qty: 9 TABLET | Refills: 0 | Status: SHIPPED | OUTPATIENT
Start: 2025-01-17
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
## 2025-03-18 ENCOUNTER — TELEMEDICINE (OUTPATIENT)
Dept: PSYCHIATRY | Facility: CLINIC | Age: 60
End: 2025-03-18
Payer: COMMERCIAL

## 2025-03-18 DIAGNOSIS — F33.2 SEVERE EPISODE OF RECURRENT MAJOR DEPRESSIVE DISORDER, WITHOUT PSYCHOTIC FEATURES: Primary | ICD-10-CM

## 2025-03-18 DIAGNOSIS — F43.29 GRIEF REACTION WITH PROLONGED BEREAVEMENT: ICD-10-CM

## 2025-03-18 DIAGNOSIS — F41.1 GAD (GENERALIZED ANXIETY DISORDER): ICD-10-CM

## 2025-03-18 NOTE — PROGRESS NOTES
Date: March 20, 2025  Time In: 3:08  Time out: 4:01      This provider is located at the [facility] ([facility]), [address], [place] [address] using a secure [employer] Video Visit through [employer]. Patient is being seen remotely via telehealth, and they are in a secure environment for this session. The patient's condition being diagnosed/treated is appropriate for telemedicine. The provider identified herself as well as her credentials. The patient, and/or patients guardian, consent to be seen remotely, and when consent is given they understand that the consent allows for patient identifiable information to be sent to a third party as needed. They may refuse to be seen remotely at any time. The electronic data is encrypted and password protected, and the patient and/or guardian has been advised of the potential risks to privacy not withstanding such measures.     Mode of Visit: Video  Location of patient: Home   Location of provider: Provider home  You have chosen to receive care through a telehealth visit.  The patient has signed the video visit consent form.  The visit included audio and video interaction. No technical issues occurred during this visit    Subjective   Olga Hansen is a 59 y.o. female who presents today fully oriented, appropriately dressed and groomed, and open to communication for follow up appointment.  7  Chief Complaint:   Chief Complaint   Patient presents with    Anxiety    Depression        History of Present Illness: Rapport was established through conversation and unconditional positive regard. Recent events were discussed and how these events impact Pt's emotional health.   Pt reports successful use of learned coping skills since last report.  Pt reports continuation of symptoms and states the intensity and duration of symptoms has remained unchanged since last report. Pt rates anxiety at 7/10 and depression at 8/10.     Sleep: Pt  "reports sleep has remained unchanged since last report. Healthy sleep habits were discussed such as maintaining a schedule, routine, avoiding caffeine, and limiting screen time before bed.    Appetite:  Pt reports appetite has been good since last report.  Discussed the importance of hydration and eating a healthy diet for overall and mental health.    Medication compliance: Pt reports medications are being taken daily as prescribed. Discussed the importance of compliance with prescriber's directions and Pt was instructed to report questions/concerns, as well as missed doses or discontinuation of medication by Pt.  Pt states she switched to a former medication and she has been taking it for about 3 weeks.  States she has notice \"a little bit\" of a difference.      Safety Plan in Place: Yes. Details: Reviewed 3/18/25 Pt denies SI/HI/SIB recent or current    Daily Functioning:  Since last report, Pt states symptoms are causing Severe difficulty in daily functioning.      Content Discussion:   Pt reports life updates since previous session. Pt identified current stressors. Pt acknowledged stressors within and outside of one's control. Pt identified successes and issues with utilizing coping mechanisms.  Pt states she has little motivation and lies in bed a lot.  \"I have no desire to get up\"   Pt states she needs to see her psychiatric np to check medication and Pt agrees to contact provider after session.  Pt states she has difficulty with transportation to visits. Ambulatory referral for [facility] medication management was placed 3/18/25.  Pt was allowed to process feeling associated with depression and lack of motivation.  Self affirming and encouraging self talk was encouraged.  Discussed the need to review safety plan as needed.  Pt adamantly denies suicidal thoughts but due to PHQ-9 score of 20 and scale of 8/10 for current depression, Pt agrees to call 911 or report to closes ER should her status worsen.  Pt " also provided information on suicide hotline and warmline, and Pt was encouraged to call warmline between appointments.  Pt agrees and states that processing and affirmation will be helpful.    Counselor supported Pt in continued exploration of underlying belief systems which contribute to difficulty in connecting/vulnerability.  Through discussion, Pt identifies themes of preservation and overt emotional and physical reactivity when physical and/or emotional statis is in question, even in low or perceived threatening situations. Counselor supported Pt in identifying past experiences and underlying beliefs which contribute to these feelings and reactions.  Pt was supported and encouraged in processing emotions related to frustrations with the expectations of self and others.  Pt was encouraged to identify cultural and nuclear familial contexts which contribute to these concerns.  Pt was receptive and engaged in conversation, and Pt reports this was beneficial and applicable to their situation.      Reviewed coping skills and encouraged Pt to continue to practicing coping skills daily when not under distress. Pt was praised for their attempts to decrease symptoms and mitigate activating events.    Clinical Maneuvering/Intervention:  CBT was utilized to encourage Pt to identify maladaptive thoughts and behaviors and replace with more affirming and positive.Pt encouraged to set and maintain appropriate and healthy boundaries with others. Pt was instructed to practice daily using appropriate and specific words to communicate feelings to others.  Motivational interviewing used to encourage Pt to identify strengths which can be utilized in working toward treatment goals. Pt encouraged to practice daily learned skills such as controlled breathing, grounding, and mindfulness. Pt was encouraged to ask for help from support persons to assist them in maintaining stability and alleviate symptoms. Discussed the importance of  being one's own mental health advocate and to refrain from seeing the need to ask for help as a weakness. Pt was encouraged to formulate a plan of action to be more proactive in managing stressors and refrain from using reactive and automatic heightened emotional responses.     Solution-focused therapy employed to identify how Pt would like their life to be if they were to make positive changes. Pt encouraged to identify effective coping skills and strengths they can use to continue utilizing those skills. Pt encouraged to discontinue utilizing non-effective coping mechanisms. Pt provided with feedback to highlight achievements and personal and other resources. Encouraged use of SMART goals    Assisted patient in processing above session content; acknowledged and normalized patient’s thoughts, feelings, and concerns.  Rationalized patient thought process regarding concerns presented at session.  Discussed triggers associated with patient's  anxiety  and depression  Also discussed coping skills for patient to implement such as 4:6 breathing , mindfulness , and positive self talk     Allowed patient to freely discuss issues without interruption or judgment. Provided safe, confidential environment to facilitate the development of positive therapeutic relationship and encourage open, honest communication. Assisted patient in identifying risk factors which would indicate the need for higher level of care including thoughts to harm self or others and/or self-harming behavior and encouraged patient to contact this office, call 911, or present to the nearest emergency room should any of these events occur. Discussed crisis intervention services and means to access. Patient adamantly and convincingly denies current suicidal or homicidal ideation or perceptual disturbance.    Assessment:     Patient appears to maintain relative stability as compared to their baseline.  However, patient persistently struggles with symptoms  which continue to cause impairment in important areas of functioning.  As a result, they can be reasonably expected to continue to benefit from treatment and would likely be at increased risk for decompensation otherwise.      PHQ-Score Total:  PHQ-9 Total Score: PHQ-9 Depression Screening  Little interest or pleasure in doing things?  3   Feeling down, depressed, or hopeless?  3   PHQ-2 Total Score     Trouble falling or staying asleep, or sleeping too much?  2   Feeling tired or having little energy?  3   Poor appetite or overeating?  1   Feeling bad about yourself - or that you are a failure or have let yourself or your family down?  3   Trouble concentrating on things, such as reading the newspaper or watching television?  2   Moving or speaking so slowly that other people could have noticed? Or the opposite - being so fidgety or restless that you have been moving around a lot more than usual?  3   Thoughts that you would be better off dead, or of hurting yourself in some way?  0   PHQ-9 Total Score  20   If you checked off any problems, how difficult have these problems made it for you to do your work, take care of things at home, or get along with other people?  Very        [place] Safety Plan completed.  National Suicide Hotline and 988 information put in Patient Instructions on [employer]  Information on how to manage suicidal thoughts included in Patient Instructions on [employer].  Depression and Anxiety help pages sent to Pt via [employer]  Pt was informed all of this information can be found in their [employer] account along with session notes.          Mental Status Exam:   Hygiene:   good  Cooperation:  Cooperative  Eye Contact:  Good  Psychomotor Behavior:  Appropriate  Affect:  Restricted and worried   Mood: depressed and anxious  Speech:  Normal  Thought Process:  Goal directed  Thought Content:  Normal  Suicidal:  None  Homicidal: None  Hallucinations:  None  Delusion: None  Memory:  Intact  Orientation:   Grossly Intact  Reliability:  good  Insight:  Good  Judgement:  good  Impulse Control:  Good  Physical/Medical Issues:  No        Functional Status: Severe impairment    Progress toward goal: Not at goal    Prognosis: Good with continued therapeutic intervention        Plan:    Patient will continue in individual outpatient therapy with focus on improved functioning and coping skills, maintaining stability, and avoiding decompensation and the need for higher level of care.    Patient will adhere to medication regimen as prescribed and report any side effects. Patient will contact this office, call 911 or present to the nearest emergency room should suicidal or homicidal ideations occur. Provide Cognitive Behavioral Therapy and Solution Focused Therapy to improve functioning, maintain stability, and avoid decompensation and the need for higher level of care.     Return in about 2 weeks (around 4/1/2025).      VISIT DIAGNOSIS:    Diagnosis Plan   1. Severe episode of recurrent major depressive disorder, without psychotic features  Ambulatory Referral to Psychiatry      2. MALACHI (generalized anxiety disorder)  Ambulatory Referral to Psychiatry      3. Grief reaction with prolonged bereavement  Ambulatory Referral to Psychiatry       15:08 EDT       This document has been electronically signed by JOLIE Murguia  March 20, 2025      Part of this note may be an electronic transcription/translation of spoken language to printed text using the Dragon Dictation System.

## 2025-03-20 NOTE — PATIENT INSTRUCTIONS
[place] warm Line: Phone number: [phone]      Monday through Friday 10 AM to 9 PM and Saturdays 5 PM to 9 PM.      A warmline is a NON-CRISIS number to call to get emotional support. You can call to have a conversation with someone who can provide support during hard times. Warmlines are staffed by trained peers who have been through their own mental health struggles and know what it's like to need help.      -Suicide hotline number: 988      -[facility] Resource Connection      The resource line is dedicated to providing behavioral health resources to residents of [place] and [facility], seven days a week, 7 a.m.-7 p.m.      [phone]      [email or web address]  [facility] [email or web address]      Should you ever need assistance or just want to reach out to someone when your behavioral health provider is not available due to the office being closed you can contact [email or web address].       -Just text HOME to [phone] and someone will reach out to you within a few minutes.  This is a 24/7 help line and they are open even on holidays.

## 2025-04-24 ENCOUNTER — TELEMEDICINE (OUTPATIENT)
Dept: PSYCHIATRY | Facility: CLINIC | Age: 60
End: 2025-04-24
Payer: COMMERCIAL

## 2025-04-24 DIAGNOSIS — F41.1 GENERALIZED ANXIETY DISORDER: Chronic | ICD-10-CM

## 2025-04-24 DIAGNOSIS — F43.29 GRIEF REACTION WITH PROLONGED BEREAVEMENT: Chronic | ICD-10-CM

## 2025-04-24 DIAGNOSIS — F33.2 SEVERE EPISODE OF RECURRENT MAJOR DEPRESSIVE DISORDER, WITHOUT PSYCHOTIC FEATURES: Primary | Chronic | ICD-10-CM

## 2025-04-24 RX ORDER — FLUOXETINE HYDROCHLORIDE 40 MG/1
40 CAPSULE ORAL DAILY
COMMUNITY
Start: 2025-04-21 | End: 2025-04-24 | Stop reason: SDUPTHER

## 2025-04-24 RX ORDER — GABAPENTIN 300 MG/1
300 CAPSULE ORAL 3 TIMES DAILY
COMMUNITY
Start: 2025-03-31

## 2025-04-24 RX ORDER — LISDEXAMFETAMINE DIMESYLATE 30 MG/1
30 CAPSULE ORAL EVERY MORNING
COMMUNITY
Start: 2025-04-22

## 2025-04-24 RX ORDER — ATORVASTATIN CALCIUM 20 MG/1
20 TABLET, FILM COATED ORAL DAILY
COMMUNITY

## 2025-04-24 NOTE — PROGRESS NOTES
This provider is located at home office working remotely through the [facility] (through [facility]), [address], [facility], [address] using a secure [employer] Video Visit through [employer]. Patient is being seen remotely via telehealth at their home address in [place], and stated they are in a secure environment for this session. The patient's condition being diagnosed/treated is appropriate for telemedicine. The provider identified herself as well as her credentials. The patient, and/or patients guardian, consent to be seen remotely, and when consent is given they understand that the consent allows for patient identifiable information to be sent to a third party as needed. They may refuse to be seen remotely at any time. The electronic data is encrypted and password protected, and the patient and/or guardian has been advised of the potential risks to privacy not withstanding such measures.    You have chosen to receive care through a telehealth visit.  Do you consent to use a video/audio connection for your medical care today? Yes    Patient identifiers utilized: Name and date of birth.    Patient verbally confirmed consent for today's encounter  04/24/2025 .    The patient does verbally confirm they are being seen today while physically located in the [facility].  This provider/this APRN is licensed in the [facility] where the patient is located/being seen.           Subjective   Olga Hansen is a 59 y.o. female who presents today for initial evaluation     Chief Complaint: Depression, anxiety, grief     Accompanied by: Self - no one else besides the patient present at today's encounter     History of Present Illness:   -The patient presents today for initial evaluation for medication management.  The patient was referred by her therapist.  The patient states that most recently she was established with a another PMHNP with [facility], but  states that she opted to discontinue treatment due to concern that she was not getting any better so states that recently her PCP has been managing her psychotropic medications.  She states that she has a history of depression, anxiety, and ADHD, although she states she has never had any formalized testing to confirm diagnosis of ADHD.  Reports that she was diagnosed with ADHD around age 48 years old by her previous psychiatrist after completing self screeners and initiated treatment with stimulant medications, which she endorses was helpful.  She states that she has struggled with depression and anxiety on and off since her early 20s.  She states that recently her symptoms have been exacerbated over the past year as she endorses that her son passed away in March 2024 and that this triggered a significant exacerbation of her symptoms.  She states that her depression was so severe that she was not getting out of bed and that she had never experienced a depressive episode as severe as this.  She states that despite being actively engaged in treatment/medication management she was not noticing any improvements in her symptoms, so states that she requested to resume treatment with Vyvanse with her PCP as she endorses that when she was taking this medication several years ago she felt her best.  Reports that upon her request PCP resume treatment with Vyvanse and states that her depression symptoms have been more well-controlled over the past month than they have since her son passed away, which she endorses she thinks is due to the Vyvanse.  She states that although depression symptoms have been improved she endorses that symptoms are still not at goal and that ideally she would like to see both depression and anxiety symptoms become more manageable.  She states that historically she has trialed several different antidepressant medications and states that the only thing she has found that it is never been effective for  "her as the Prozac.  She states that last year she was switched from the Prozac to Viibryd, but states that the Viibryd was completely ineffective for her so she recently switched back to the Prozac per the recommendation of her PCP approximately 4 months ago.  States that overall she has noticed some improvement since going back to the Prozac, but again that symptoms are still not at goal.    -The patient endorses significant symptoms of depression including: changes in sleep, reduced interests in activities, feelings of guilt, changes in energy level, difficulty with concentration, and psychomotor changes which have caused impairment in important areas of daily functioning.      -The patient endorses significant symptoms of anxiety including: excessive anxiety and worry about a number of events or activities for more days than not, restlessness or feeling keyed up, being easily fatigued, difficulty concentrating or mind going blank, irritability, muscle tension, and sleep disturbance which have caused impairment in important areas of daily functioning.      -Patient rates symptoms of depression recently since resuming treatment with Vyvanse at a 9/10 on a 0-10 scale, with 10 being the worst.  Patient states that prior to resuming treatment with Vyvanse she would have rated depression symptoms at \"well over a 10\"/10 on a 0-10 scale, with 10 being the worst.  -Patient rates symptoms of anxiety at a 6-7/10 on a 0-10 scale, with 10 being the worst.    -Sleep reported as: \"better recently\".  Reports that recently she has been sleeping better, but states that historically she has had a lot of difficulty falling asleep.  Reports that sleep has improved over the past few weeks, which coincides with when she initiated treatment with Vyvanse.  The patient states that when she initiated treatment with Vyvanse she started getting out of bed more and becoming more active, which is likely why sleep improved as she reported that " previously she had been staying in bed and sleeping all day which likely contributed to difficulty sleeping at nighttime.    -Reported medication compliance: The patient reports compliance with their current psychotropic medication regimen.    -Reported medication side effects or concerns: Denies    -Auditory or visual hallucinations: Denies  -Behaviors different from patient baseline, or any reckless, impulsive, or risky behaviors: Denies  -Symptoms of autumn or psychosis: Denies  -Self-injurious behavior: Denies  -SI/HI: The patient adamantly denies any suicidal or homicidal ideations, plans, or intent at the time of this encounter and is convincing.  -No abnormal muscle movements or tics noted by this APRN during today's encounter, and the patient denies any of these symptoms.      -Using a shared decision-making approach the patient reports she is interested in increasing the dose of Prozac in effort to obtain better management of symptoms at this time.  Discussed with the patient that we will prioritize maximizing the dose of Prozac at this time as historically she has done better with this medication than any other antidepressant medication that she has trialed, which she states there have been many.  Discussed with the patient that if symptoms are not at goal with Prozac as monotherapy we can discuss potential adjunct treatment options, such as Vraylar, and effort to obtain better management of symptoms, specifically depression as she reports this is her main concern at this time.  The patient verbalizes understanding in her own words and endorses that she is agreeable to this.    -The patient does verbally contract for safety at today's encounter and is in verbal agreement with the safety/crisis plan. The patient reports in their own words that they will reach out to this APRN/office prior to next scheduled appointment if there is any worsening of mood, any new psychiatric symptoms, any medication side  effects or concerns, any concern for safety to self or others, any suicidal or homicidal ideations plans or intent, or any concerns, or they will call 911, call or text the suicide and crisis lifeline at [phone], or go to the closest emergency department.                Current Psychiatric Medications:  - Prozac 40 mg daily - reports she has been on this medication most recently for approximately 4 months total.  Reports that PCP recently increased dose to 40 mg daily approximately 1 month ago.  Reports that she has been on Prozac intermittently over the past several years.  Reports she feels this is the only antidepressant medication that has ever been effective for her and thus why she has always returned back to her taking it.  Reports partial efficacy with the Prozac currently.  Reports she tolerates well without any side effects.  - Vyvanse 30 mg daily - reports PCP recently started her back on this medication approximately 1 month ago.  Reports that she had been off of this medication for several years, but recently resumed treatment last month per her request with PCP.  Reports she has tolerated well without any side effects.  Reports depressive symptoms have been significantly improved and that overall she has been feeling better since starting this medication.  - Gabapentin 300 mg 3 times daily - reports PCP is currently managing this medication and that it is being prescribed to her for anxiety.  Reports that she was initially started on this medication for treatment of anxiety several years ago by her previous psychiatrist, but states that after the psychiatrist retired she ended up discontinuing all of her psychiatric medications due to lack of continuity of care.  Reports that her previous psychiatrist had her on a much higher dose and thinks she was on either 600 or 800 mg 3 times daily then.  Reports that within the past 6 months her PCP has restarted her on this medication for treatment of anxiety and  endorses that it has been helpful for her.  Reports that she tolerates well without any side effects.    Prior Psychiatric Medications:  - Viibryd - reports ineffective  - Adderall 10 mg daily - reports she was on this medication in combination with Vyvanse 60 mg daily for treatment of ADHD while under the care of her previous psychiatrist.  Reports it was very effective for her and she tolerated well without any side effects.  - Reports she has been on several different antidepressants over the years, but states that over the past 10 years or so these were the only psychiatric medications that she is taking, as well as her current psychiatric medication regimen of Prozac, Vyvanse, and gabapentin.  Reports that she cannot recall the names of all the different antidepressants that she is taken, but states that they were all ineffective or intolerable for some reason or another.  Reports that the only ended present that she has ever taken that ever seem to be helpful or effective for her was Prozac.    Currently in Counseling or Therapy:  Reports she recently established in therapy at this clinic with [facility].  Reports she has had 2 visits thus far.    Prior Psychiatric Outpatient Care:  Reports she was previously established with a psychiatrist at [facility].  Reports that she was seen by this provider for a couple of years before they retired, which she states was approximately 5-7 years ago.  Reports that last year after her son passed away she established in treatment with a PMHNP with [facility] in [place] (CASSANDRA Cruz).  Reports that she saw her previous provider for approximately 1 year, but states that she did not feel that she was getting any better while under her care so she opted to discontinue treatment.  Reports that her PCP has been managing her psychotropic medications since then.    Prior Psychiatric Hospitalizations:  Denies    Previous Suicide  "Attempts:  Denies    Previous Self-Harming Behavior:  Denies    Any family history of suicide attempts:  Denies    Legal History, Arrests, or Incarcerations:  No current legal charges pending.  Denies any history of arrests or incarcerations.     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  Reports she has been smoking for approximately 40 years now.  Vaping: Denies  Smokeless Tobacco: Denies  Illicit Substances: Denies  Prescription Medication Misuse: Denies    Social History:  Born: [place], [place]  Raised: [place], [place]  Currently resides in [place], [place]  Marriage status:  (since )  Children: One living son (age 35) and one son  (passed 2024 at age 40)  Lives with: The patient's currently household consists of the patient, her , their youngest son, and their granddaughter.     Trauma/Abuse History:  The patient states \"I cannot get into a lot of right now\" when asked to share any history of trauma/abuse.    Patient's Support Network Includes:   sister and youngest son    Last Menstrual Period:  Reports she does not have menstrual cycles as she has had a hysterectomy.  Denies any chance of pregnancy at this time.   The patient was educated that her prescribed medications can have potential risk to a developing fetus. The patient is advised to contact this APRN/this office if she becomes pregnant or plans to become pregnant.  Pt verbalizes understanding and acknowledged agreement with this plan in her own words.         The following portions of the patient's history were reviewed and updated as appropriate: allergies, current medications, past family history, past " medical history, past social history, past surgical history and problem list.          Past Medical History:  Past Medical History:   Diagnosis Date    ADHD (attention deficit hyperactivity disorder)     Anxiety     Back pain     DDD (degenerative disc disease), cervical     Depression     MRSA (methicillin resistant staph aureus) culture positive     Neck pain     Vertigo 2017       Social History:  Social History     Socioeconomic History    Marital status:    Tobacco Use    Smoking status: Every Day     Current packs/day: 2.00     Average packs/day: 2.0 packs/day for 40.0 years (80.0 ttl pk-yrs)     Types: Cigarettes    Smokeless tobacco: Never   Vaping Use    Vaping status: Never Used   Substance and Sexual Activity    Alcohol use: No    Drug use: Not Currently     Comment: previously used kratom    Sexual activity: Defer       Family History:  Family History   Problem Relation Age of Onset    Suicide Attempts Sister     Anxiety disorder Sister     Anxiety disorder Brother        Past Surgical History:  Past Surgical History:   Procedure Laterality Date    HYSTERECTOMY      OVARIAN CYST REMOVAL         Problem List:  Patient Active Problem List   Diagnosis    Sepsis    MALACHI (generalized anxiety disorder)    MDD (major depressive disorder), recurrent episode, moderate       Allergy:   No Known Allergies     Current Medications:   Current Outpatient Medications   Medication Sig Dispense Refill    atorvastatin (LIPITOR) 20 MG tablet Take 1 tablet by mouth Daily.      FLUoxetine (PROzac) 20 MG capsule Take 1 capsule by mouth Daily. Take in combination with 40 mg capsule for total daily dose of 60 mg. 30 capsule 0    gabapentin (NEURONTIN) 300 MG capsule Take 1 capsule by mouth 3 (Three) Times a Day.      lisdexamfetamine (VYVANSE) 30 MG capsule Take 1 capsule by mouth Every Morning       No current facility-administered medications for this visit.       Review of Symptoms:    Review of Systems    Constitutional:  Positive for activity change and fatigue. Negative for appetite change, unexpected weight gain and unexpected weight loss.   Psychiatric/Behavioral:  Positive for dysphoric mood, depressed mood and stress. Negative for agitation, behavioral problems, decreased concentration, hallucinations, self-injury, sleep disturbance, suicidal ideas and negative for hyperactivity. The patient is nervous/anxious.          Physical Exam:   There were no vitals taken for this visit. There is no height or weight on file to calculate BMI.     The patient was seen remotely today via a MyChart Video Visit through [employer].  Unable to obtain vital signs due to nature of remote visit.  Height stated at 66 inches.  Weight stated at 115 pounds.     Due to the nature of virtual visits and inability to monitor vital signs and weight with virtual visits, the patient has been encouraged to monitor their vital signs and weight regularly either through self-monitoring via home device(s) or with their Primary Care Provider, and the patient has been instructed to notify this APRN of any abnormalities or significant changes from baseline.       Physical Exam  Constitutional:       Appearance: Normal appearance.   Neurological:      Mental Status: She is alert.   Psychiatric:         Attention and Perception: Attention and perception normal.         Mood and Affect: Affect normal. Mood is anxious and depressed.         Speech: Speech normal.         Behavior: Behavior normal. Behavior is cooperative.         Thought Content: Thought content normal. Thought content does not include homicidal or suicidal ideation. Thought content does not include homicidal or suicidal plan.         Cognition and Memory: Cognition and memory normal.         Judgment: Judgment normal.           Mental Status Exam:   Hygiene:   good  Cooperation:  Cooperative  Eye Contact:  Good  Psychomotor Behavior:  Appropriate  Affect:  Full range  Mood: depressed and  anxious  Hopelessness: Denies  Speech:  Normal  Thought Process:  Goal directed and Linear  Thought Content:  Normal  Suicidal:  None  Homicidal:  None  Hallucinations:  None  Delusion:  None  Memory:  Intact  Orientation:  Person, Place, Time, and Situation  Reliability:  good  Insight:  Fair  Judgement:  Fair  Impulse Control:  Fair  Physical/Medical Issues:  Yes see medical history              PHQ-9 Depression Screening  Little interest or pleasure in doing things? (Patient-Rptd) Almost all   Feeling down, depressed, or hopeless? (Patient-Rptd) Almost all   PHQ-2 Total Score (Patient-Rptd) 6   Trouble falling or staying asleep, or sleeping too much? (Patient-Rptd) Almost all   Feeling tired or having little energy? (Patient-Rptd) Almost all   Poor appetite or overeating? (Patient-Rptd) Over half   Feeling bad about yourself - or that you are a failure or have let yourself or your family down? (Patient-Rptd) Almost all   Trouble concentrating on things, such as reading the newspaper or watching television? (Patient-Rptd) Over half   Moving or speaking so slowly that other people could have noticed? Or the opposite - being so fidgety or restless that you have been moving around a lot more than usual? (Patient-Rptd) Almost all   Thoughts that you would be better off dead, or of hurting yourself in some way? (Patient-Rptd) Not at all   PHQ-9 Total Score (Patient-Rptd) 22   If you checked off any problems, how difficult have these problems made it for you to do your work, take care of things at home, or get along with other people? (Patient-Rptd) Extremely difficult       MALACHI-7  Feeling nervous, anxious or on edge: (Patient-Rptd) More than half the days  Not being able to stop or control worrying: (Patient-Rptd) More than half the days  Worrying too much about different things: (Patient-Rptd) Nearly every day  Trouble Relaxing: (Patient-Rptd) More than half the days  Being so restless that it is hard to sit still:  (Patient-Rptd) Several days  Feeling afraid as if something awful might happen: (Patient-Rptd) More than half the days  Becoming easily annoyed or irritable: (Patient-Rptd) Several days  MALACHI 7 Total Score: (Patient-Rptd) 13  If you checked any problems, how difficult have these problems made it for you to do your work, take care of things at home, or get along with other people: (Patient-Rptd) Very difficult       The LILIANA report, request number [id number], of the past 12 months were reviewed.    Past available provider notes reviewed by this APRN at today's encounter.         Lab Results:   No visits with results within 1 Month(s) from this visit.   Latest known visit with results is:   Admission on 09/27/2023, Discharged on 09/27/2023   Component Date Value Ref Range Status    COVID19 09/27/2023 Not Detected  Not Detected - Ref. Range Final    Influenza A PCR 09/27/2023 Not Detected  Not Detected Final    Influenza B PCR 09/27/2023 Not Detected  Not Detected Final    Color, UA 09/27/2023 Yellow  Yellow, Straw Final    Appearance, UA 09/27/2023 Clear  Clear Final    pH, UA 09/27/2023 6.5  4.5 - 8.0 Final    Specific Gravity, UA 09/27/2023 1.010  1.003 - 1.030 Final    Glucose, UA 09/27/2023 Negative  Negative Final    Ketones, UA 09/27/2023 Negative  Negative Final    Bilirubin, UA 09/27/2023 Negative  Negative Final    Blood, UA 09/27/2023 Negative  Negative Final    Protein, UA 09/27/2023 Negative  Negative Final    Leuk Esterase, UA 09/27/2023 Trace (A)  Negative Final    Nitrite, UA 09/27/2023 Negative  Negative Final    Urobilinogen, UA 09/27/2023 0.2 E.U./dL  0.2 - 1.0 E.U./dL Final    RBC, UA 09/27/2023 None Seen  None Seen /HPF Final    WBC, UA 09/27/2023 3-5 (A)  None Seen /HPF Final    Urine culture not indicated.    Bacteria, UA 09/27/2023 None Seen  None Seen /HPF Final    Squamous Epithelial Cells, UA 09/27/2023 0-2  None Seen, 0-2 /HPF Final    Hyaline Casts, UA 09/27/2023 0-2  None Seen /LPF Final     Methodology 09/27/2023 Manual Light Microscopy   Final         Assessment & Plan   Diagnoses and all orders for this visit:    1. Severe episode of recurrent major depressive disorder, without psychotic features (Primary)  -     FLUoxetine (PROzac) 20 MG capsule; Take 1 capsule by mouth Daily. Take in combination with 40 mg capsule for total daily dose of 60 mg.  Dispense: 30 capsule; Refill: 0    2. Generalized anxiety disorder  -     FLUoxetine (PROzac) 20 MG capsule; Take 1 capsule by mouth Daily. Take in combination with 40 mg capsule for total daily dose of 60 mg.  Dispense: 30 capsule; Refill: 0    3. Grief reaction with prolonged bereavement  -     FLUoxetine (PROzac) 20 MG capsule; Take 1 capsule by mouth Daily. Take in combination with 40 mg capsule for total daily dose of 60 mg.  Dispense: 30 capsule; Refill: 0        Visit Diagnoses:    ICD-10-CM ICD-9-CM   1. Severe episode of recurrent major depressive disorder, without psychotic features  F33.2 296.33   2. Generalized anxiety disorder  F41.1 300.02   3. Grief reaction with prolonged bereavement  F43.29 309.0          GOALS:  Short Term Goals: Patient will be compliant with medication, and patient will have no significant medication related side effects.  Patient will be engaged in psychotherapy as indicated.  Patient will report subjective improvement of symptoms.  Long term goals: To stabilize mood and treat/improve subjective symptoms, the patient will stay out of the hospital, the patient will be at an optimal level of functioning, and the patient will take all medications as prescribed.  The patient verbalized understanding and agreement with goals that were mutually set.      SUICIDE RISK ASSESSMENT: Unalterable demographics and a history of mental health intervention indicate this patient is in a high risk category compared to the general population. At present, the patient denies active SI/HI, intentions, or plans at this time and agrees to  seek immediate care should such thoughts develop. The patient verbalizes understanding of how to access emergency care if needed and agrees to do so. Consideration of suicide risk and protective factors such as history, current presentation, individual strengths and weaknesses, psychosocial and environmental stressors and variables, psychiatric illness and symptoms, medical conditions and pain, took place in this interview. Based on those considerations, the patient is determined: within individual baseline and presenting no imminent risk for suicide or homicide. Other recommendations: The patient does not meet the criteria for inpatient admission and is not a safety risk to self or others at today's visit. Inpatient treatment offers no significant advantages over outpatient treatment for this patient at today's visit.      SAFETY PLAN:  Patient was given ample time for questions and fully participated in treatment planning.  Patient was encouraged to call the clinic with any questions or concerns.  Patient was informed of access to emergency care. If patient were to develop any significant symptomatology, suicidal ideation, homicidal ideation, any concerns, or feel unsafe at any time they are to call the clinic and if unable to get immediate assistance should immediately call 911 or go to the nearest emergency room.  The patient is advised to remove or secure (lock away) all lethal weapons (including guns) and sharps (including razors, scissors, knives, etc.).  All medications (including any prescribed and any over the counter medications) should be stored in a safe and secured location that is not obtainable by children/adolescents.  Patient was given an opportunity and encouraged to ask questions about their medication, illness, and treatment. Patient contracted verbally for the following: If you are experiencing an emotional crisis or have thoughts of harming yourself or others, please go to your nearest local  emergency room or call 911. Will continue to re-assess medication response and side effects frequently to establish efficacy and ensure safety. Risks, any black box warnings, side effects, off label usage, and benefits of medication and treatment discussed with patient, along with potential adverse side effects of current and/or newly prescribed medication, alternative treatment options, and OTC medications.  Patient verbalized understanding of potential risks, any off label use of medication, any black box warnings, and any side effects in their own words. The patient verbalized understanding and agreed to comply with the safety plan discussed in their own words.  Patient given the number to the office. Number also available to the 24- hour suicide hotline.       TREATMENT PLAN: Continue supportive psychotherapy efforts and medications as indicated.  Medication and treatment options, both pharmacological and non-pharmacological treatment options, discussed during today's visit, including any off label use of medication. Patient acknowledged and verbally consented with current treatment plan and was educated on the importance of compliance with treatment and follow-up appointments.          - Increase Prozac to 60 mg daily for depression/anxiety.  The patient states that she recently received new prescription for 90-day supply of Prozac 40 mg capsules from PCP earlier this week.  Discussed with the patient that prescription for 20 mg capsules is being sent in at this time and that she should take this in combination with a 40 mg capsule daily for a total dose of 60 mg daily.  The patient verbalizes understanding in her own words and endorses that she is agreeable to this.  - Discussed with the patient that gabapentin prescribed for anxiety is considered off-label use.  Discussed with the patient that this APRN does not recommend gabapentin for treatment of anxiety, although it would be considered in rare  circumstances such as severe cases or extremely treatment resistant, but discussed with the patient that at this point this APRN does not recommend off-label treatment for anxiety especially with a controlled substance as she is already taking another controlled substance (Vyvanse) and it is not recommended to combine stimulants and CNS depressants due to potential safety risks and side effects with this combination.  Discussed with the patient that since her PCP is currently managing this medication she will need to discuss further treatment recommendations regarding this medication with the prescriber, but this APRN does not recommend continued treatment with gabapentin if concurrently taking a stimulant medication and does not recommend gabapentin for off-label treatment of anxiety at this point and therefore is unwilling to assume prescriptive responsibility.  The patient verbalizes understanding in her own words and endorses she plans to discuss this further with her PCP who is managing this medication currently.  - The patient requests evaluation and potential treatment for the patient's concern of possible ADHD.  Discussed with the patient that to continue with testing and potential future treatment for patient's concern of possible ADHD the patient will need to complete further psychological testing through a Psychologist for more in-depth definitive testing and to rule out other differential diagnoses that could potentially be contributing to the patient's symptomology.  The patient has been advised to contact their insurance company and request a list of preferred Psychologists in the patient's area that is covered under the patient's current insurance plan for a psychological evaluation.   -  The patient requests evaluation and potential treatment for the patient's concern of reported ADHD, but the patient has not completed formalized/psychological testing confirming this diagnosis.  To continue with  testing and potential future treatment for patient's concern of possible ADHD the patient will need to complete the following recommendations:  1. The patient will need further psychological testing through a Psychologist for more in-depth definitive testing and to rule out other differential diagnoses that could potentially be contributing to the patient's symptomatology.  The patient has been advised to contact their insurance company and request a list of preferred Psychologists in the patient's area that is covered under the patient's current insurance plan for a psychological evaluation.  2. The patient will need to follow up with their Primary Care Provider for a 12-lead ECG, measurement and monitoring of vital signs including blood pressure and heart rate, and documented medical clearance that the patient is medically safe to pursue both non-stimulant and stimulant medication approaches for the treatment of ADHD.  3. After the patient has completed psychological testing those results will need to be faxed to this office at [phone], or uploaded into the patient's medical record via one of their [facility] Provider's offices, for review by this Provider prior to their next scheduled appointment.  After results of the patient's psychological testing have been uploaded into the medical record the patient will need to schedule a follow-up appointment to discuss the results of their psychological testing, and results of the Primary Care Provider's medical evaluation determination with ECG testing, by calling [phone] to schedule a follow-up appointment.  If there are any concerns the patient is advised to call [phone] for any questions or concerns.   - The patient states that in regard to the above recommendations she ideally would like for her PCP to continue managing/prescribing her Vyvanse as she endorses that she is already completing vital sign monitoring and UDS monitoring with PCP.  She  states that if PCP is unwilling to continue treatment with Vyvanse she will then pursue additional testing for confirmation of ADHD diagnosis and other recommendations per the request of this APRN, but otherwise she will just plan to allow PCP to continue managing/prescribing Vyvanse.   - [facility]  Discussed with the patient appropriate and realistic expectations regarding medication management, especially in regard to her grief and exacerbation of depression and anxiety symptoms since the loss of her son.  Discussed with the patient the importance of psychotherapy in her treatment plan and encouraged the patient to remain active and involved in therapy in effort to achieve the most favorable outcomes.  The patient verbalizes understanding in her own words and endorses that she is agreeable to this.        MEDICATION ISSUES: Discussed medication options and treatment plan of prescribed medication, any off label use of medication, as well as the risks, benefits, any black box warnings including increased suicidality, and side effects including but not limited to potential falls, dizziness, possible impaired driving, GI side effects (change in appetite, abdominal discomfort, nausea, vomiting, diarrhea, and/or constipation), dry mouth, somnolence, sedation, insomnia, activation, agitation, irritation, tremors, abnormal muscle movements or disorders, headache, sweating, possible bruising or rare bleeding, electrolyte and/or fluid abnormalities, change in blood pressure/heart rate/and or heart rhythm, sexual dysfunction, and metabolic adversities among others. Patient and/or guardian agreeable to call the office with any worsening of symptoms or onset of side effects, or if any concerns or questions arise.  The contact information for the office is made available to the patient and/or guardian.  Patient and/or guardian agreeable to call 911 or go to the nearest ER should they begin  having any SI/HI, or if any urgent concerns arise. No medication side effects or related complaints today.        VERBAL INFORMED CONSENT FOR MEDICATION:  The patient was educated that their proposed/prescribed psychotropic medication(s) has potential risks, side effects, adverse effects, and black box warnings; and these have been discussed with the patient.  The patient has been informed that their treatment and medication dosage is to be individualized, and may even be above or below the recommended range/dosage due to patient individualization and response, but medication is prescribed using a shared decision making approach, and no medication or dosage will be prescribed without the patient's verbal consent.  The reason for the use of the medication including any off label use and alternative modes of treatment other than or in addition to medication has been considered and discussed, the probable consequences of not receiving the proposed treatment have been discussed, and any treatment side effects, black box warnings, and cautions associated with treatment have been discussed with the patient.  The patient is allowed ample time to openly discuss and ask questions regarding the proposed medication(s) and treatment plan and the patient verbalizes understanding the reasons for the use of the medication, its potential risks and benefits, other alternative treatment(s), and the probable consequences that may occur if the proposed medication is not given.  The patient has been given ample time to ask questions and study the information and find the information to be specific, accurate, and complete.  The patient gives verbal consent for the medication(s) proposed/prescribed, they verbalized understanding that they can refuse and withdraw consent at any time with the assistance of this APRN, and the patient has verbally confirmed that they are aware, and are willing, to take the prescribed medication and follow the  treatment plan with the known possible risks, side effect, black box warnings, and any potential medication interactions, and the patient reports they will be worse off without this medication and treatment plan.  The patient is advised to contact this APRN/this office if any questions or concerns arise at any time (at [phone]), or call 911/go to the closest emergency department if needed or outside of office hours.         [facility] No Show Policy:  We understand unexpected circumstances arise; however, anytime you miss your appointment we are unable to provide you appropriate care.  In addition, each appointment missed could have been used to provide care for others.  We ask that you call at least 24 hours in advance to cancel or reschedule an appointment.  We would like to take this opportunity to remind you of our policy stating patients who miss THREE or more appointments without cancelling or rescheduling 24 hours in advance of the appointment may be subject to cancellation of any further visits with our clinic and recommendation to seek in-person services/visits.    Please call [phone] to reschedule your appointment. If there are reasons that make it difficult for you to keep the appointments, please call and let us know how we can help.  Please understand that medication prescribing will not continue without seeing your provider.      [facility]'s No Show Policy reviewed with patient at today's visit. Patient verbalized understanding of this policy. Discussed with patient that in the event that there are three or more no show visits, it will be recommended that they pursue in-person services/visits as noncompliance with telehealth visits indicates that patient is not an appropriate candidate for telemedicine and would likely be more appropriate for in-person services/visits. Patient verbalizes understanding and is agreeable to this.             MEDS  ORDERED DURING VISIT:  New Medications Ordered This Visit   Medications    FLUoxetine (PROzac) 20 MG capsule     Sig: Take 1 capsule by mouth Daily. Take in combination with 40 mg capsule for total daily dose of 60 mg.     Dispense:  30 capsule     Refill:  0       Return in about 4 weeks (around 5/22/2025), or if symptoms worsen or fail to improve, for Recheck.           The patient will follow-up in approximately 4 weeks, and follow-up appointment has been scheduled with the patient confirming agreeability/availability for date/time during today's encounter.  Instructed the patient to contact the [facility] if they have any questions or concerns, and that a sooner appointment will be provided if needed.  The patient verbalized understanding in their own words and endorsed that they are agreeable to this.      Functional Status: Severe impairment    Prognosis: Guarded with Ongoing Treatment             This document has been electronically signed by CASSANDRA Roberts  April 24, 2025 15:29 EDT    Part of this note may be an electronic transcription/translation of spoken language to printed text using the Dragon Dictation System.

## 2025-04-29 ENCOUNTER — TELEMEDICINE (OUTPATIENT)
Dept: PSYCHIATRY | Facility: CLINIC | Age: 60
End: 2025-04-29
Payer: COMMERCIAL

## 2025-04-29 DIAGNOSIS — F43.29 GRIEF REACTION WITH PROLONGED BEREAVEMENT: ICD-10-CM

## 2025-04-29 DIAGNOSIS — F33.2 SEVERE EPISODE OF RECURRENT MAJOR DEPRESSIVE DISORDER, WITHOUT PSYCHOTIC FEATURES: Primary | ICD-10-CM

## 2025-04-29 DIAGNOSIS — F41.1 GAD (GENERALIZED ANXIETY DISORDER): ICD-10-CM

## 2025-04-29 DIAGNOSIS — F41.1 GENERALIZED ANXIETY DISORDER: ICD-10-CM

## 2025-04-29 NOTE — PROGRESS NOTES
Date: April 29, 2025  Time In: 2:11  Time out: 2:48      This provider is located at the [facility]), [address], [place] [address] using a secure [employer] Video Visit through [employer]. Patient is being seen remotely via telehealth, and they are in a secure environment for this session. The patient's condition being diagnosed/treated is appropriate for telemedicine. The provider identified herself as well as her credentials. The patient, and/or patients guardian, consent to be seen remotely, and when consent is given they understand that the consent allows for patient identifiable information to be sent to a third party as needed. They may refuse to be seen remotely at any time. The electronic data is encrypted and password protected, and the patient and/or guardian has been advised of the potential risks to privacy not withstanding such measures.     Mode of Visit: Video  Location of patient: Home  Location of provider: Provider home  You have chosen to receive care through a telehealth visit.  The patient has signed the video visit consent form.  The visit included audio and video interaction. No technical issues occurred during this visit    Subjective   Olga Hansen is a 59 y.o. female who presents today fully oriented, appropriately dressed and groomed, and open to communication for follow up appointment.    Chief Complaint:   Chief Complaint   Patient presents with    Anxiety    Depression    Sleeping Problem    Grief        History of Present Illness: Rapport was established through conversation and unconditional positive regard. Recent events were discussed and how these events impact Pt's emotional health.   Pt reports successful use of learned coping skills since last report.  Pt reports continuation of symptoms and states the intensity and duration of symptoms has improved since last report. Pt rates anxiety at 7/10 and depression at  "8/10.     Sleep: Pt reports sleep has improved since last report. Healthy sleep habits were discussed such as maintaining a schedule, routine, avoiding caffeine, and limiting screen time before bed.  States she is not fighting going to sleep anymore.    Appetite:  Pt reports appetite has been fair since last report.  Discussed the importance of hydration and eating a healthy diet for overall and mental health.  \"My appetite has been good\"    Medication compliance: Pt reports medications are being taken daily as prescribed. Discussed the importance of compliance with prescriber's directions and Pt was instructed to report questions/concerns, as well as missed doses or discontinuation of medication by Pt.  Pt states she is working with her medication provider on adjusting medications.  States she is doing better with the depression.      Safety Plan in Place: Yes. Details: Reviewed Safety Plan  Pt denies SI/HI/SIB recent or current    Daily Functioning:  Since last report, Pt states symptoms are causing severe difficulty in daily functioning.      Content Discussion:   Pt reports life updates since previous session. Pt identified current stressors. Pt acknowledged stressors within and outside of one's control. Pt identified successes and issues with utilizing coping mechanisms.  Pt states she saw her medication provider and they discussed what is working and they made a plan for Pt to see about ADHD medication. Pt states she continues to have daily depression symptoms.  States she is not suicidal or hopeless, but she is '\"just terribly sad.\"  States \"I miss my son so much.\"  \"I stay in the bed watching TV pretty much all the time.\"   Pt was encouraged to verbally process son's death and how his last words continue to affect Pt. Pt states just before his death he as Pt to not lt him die.  Pt was allowed to verbally process and gain some insight into how son's statements were not and expectation of Pt.  Pt states she " "understands he knew she could not save him.  Pt states she carries guilt over his childhood. \"He had a hard life.\"  Discussed letting go of the guilt as though dropping a stone.  Pt states she will visualize this.  Pt states she was able to gain some insight and see things from a different viewpoint.  States she will work on letting go of guilt.     Counselor supported Pt in continued exploration of underlying belief systems which contribute to difficulty in connecting/vulnerability.  Through discussion, Pt identifies themes of preservation and overt emotional and physical reactivity when physical and/or emotional statis is in question, even in low or perceived threatening situations. Counselor supported Pt in identifying past experiences and underlying beliefs which contribute to these feelings and reactions.  Pt was supported and encouraged in processing emotions related to frustrations with the expectations of self and others.  Pt was encouraged to identify cultural and nuclear familial contexts which contribute to these concerns.  Pt was receptive and engaged in conversation, and Pt reports this was beneficial and applicable to their situation.      Reviewed coping skills and encouraged Pt to continue to practicing coping skills daily when not under distress. Pt was praised for their attempts to decrease symptoms and mitigate activating events.    Clinical Maneuvering/Intervention:  CBT was utilized to encourage Pt to identify maladaptive thoughts and behaviors and replace with more affirming and positive.Pt encouraged to set and maintain appropriate and healthy boundaries with others. Pt was instructed to practice daily using appropriate and specific words to communicate feelings to others.  Motivational interviewing used to encourage Pt to identify strengths which can be utilized in working toward treatment goals. Pt encouraged to practice daily learned skills such as controlled breathing, grounding, and " mindfulness. Pt was encouraged to ask for help from support persons to assist them in maintaining stability and alleviate symptoms. Discussed the importance of being one's own mental health advocate and to refrain from seeing the need to ask for help as a weakness. Pt was encouraged to formulate a plan of action to be more proactive in managing stressors and refrain from using reactive and automatic heightened emotional responses.     Solution-focused therapy employed to identify how Pt would like their life to be if they were to make positive changes. Pt encouraged to identify effective coping skills and strengths they can use to continue utilizing those skills. Pt encouraged to discontinue utilizing non-effective coping mechanisms. Pt provided with feedback to highlight achievements and personal and other resources. Encouraged use of SMART goals    Assisted patient in processing above session content; acknowledged and normalized patient’s thoughts, feelings, and concerns.  Rationalized patient thought process regarding concerns presented at session.  Discussed triggers associated with patient's  anxiety  and depression  Also discussed coping skills for patient to implement such as mindfulness , self care , and positive self talk     Allowed patient to freely discuss issues without interruption or judgment. Provided safe, confidential environment to facilitate the development of positive therapeutic relationship and encourage open, honest communication. Assisted patient in identifying risk factors which would indicate the need for higher level of care including thoughts to harm self or others and/or self-harming behavior and encouraged patient to contact this office, call 911, or present to the nearest emergency room should any of these events occur. Discussed crisis intervention services and means to access. Patient adamantly and convincingly denies current suicidal or homicidal ideation or perceptual  "disturbance.    Assessment:     Patient appears to maintain relative stability as compared to their baseline.  However, patient persistently struggles with symptoms which continue to cause impairment in important areas of functioning.  As a result, they can be reasonably expected to continue to benefit from treatment and would likely be at increased risk for decompensation otherwise.        Mental Status Exam:   Hygiene:   good  Cooperation:  Cooperative  Eye Contact:  Good  Psychomotor Behavior:  Restless   cried intermittently   Affect:  Restricted, flat, tearful  Mood: sad, depressed, and anxious  Speech:  Normal  Thought Process:  Goal directed and Pressured by grief  Thought Content:  Mood congruent  Suicidal:  None \"I'm not going to kill myself or anything.\"  Homicidal: None  Hallucinations:  None  Delusion: None  Memory:  Intact  Orientation:  Grossly Intact  Reliability:  good  Insight:  Fair  Judgement:  Good  Impulse Control:  Good  Physical/Medical Issues:  No        Functional Status: Severe impairment    Progress toward goal: Not at goal    Prognosis: Good with continued therapeutic intervention        Plan:    Patient will continue in individual outpatient therapy with focus on improved functioning and coping skills, maintaining stability, and avoiding decompensation and the need for higher level of care.    Patient will adhere to medication regimen as prescribed and report any side effects. Patient will contact this office, call 911 or present to the nearest emergency room should suicidal or homicidal ideations occur. Provide Cognitive Behavioral Therapy and Solution Focused Therapy to improve functioning, maintain stability, and avoid decompensation and the need for higher level of care.     Return in about 2 weeks (around 5/13/2025).      VISIT DIAGNOSIS:    Diagnosis Plan   1. Severe episode of recurrent major depressive disorder, without psychotic features        2. Generalized anxiety disorder   "      3. Grief reaction with prolonged bereavement        4. MALACHI (generalized anxiety disorder)         14:02 EDT       This document has been electronically signed by JOLIE Murguia  April 29, 2025      Part of this note may be an electronic transcription/translation of spoken language to printed text using the Dragon Dictation System.

## 2025-04-30 NOTE — PATIENT INSTRUCTIONS
[place] warm Line: Phone number: [phone]      Monday through Friday 10 AM to 9 PM and Saturdays 5 PM to 9 PM.      A warmline is a NON-CRISIS number to call to get emotional support. You can call to have a conversation with someone who can provide support during hard times. Warmlines are staffed by trained peers who have been through their own mental health struggles and know what it's like to need help.      -Suicide hotline number: 988      -[facility]      The resource line is dedicated to providing behavioral health resources to residents of [place] and [facility], seven days a week, 7 a.m.-7 p.m.      [phone]      [email or web address]  [facility]      Should you ever need assistance or just want to reach out to someone when your behavioral health provider is not available due to the office being closed you can contact [email or web address]       -Just text HOME to [phone] and someone will reach out to you within a few minutes.  This is a 24/7 help line and they are open even on holidays.

## 2025-05-13 ENCOUNTER — TELEMEDICINE (OUTPATIENT)
Dept: PSYCHIATRY | Facility: CLINIC | Age: 60
End: 2025-05-13
Payer: COMMERCIAL

## 2025-05-13 DIAGNOSIS — F41.1 GENERALIZED ANXIETY DISORDER: ICD-10-CM

## 2025-05-13 DIAGNOSIS — F43.29 GRIEF REACTION WITH PROLONGED BEREAVEMENT: ICD-10-CM

## 2025-05-13 DIAGNOSIS — F33.2 SEVERE EPISODE OF RECURRENT MAJOR DEPRESSIVE DISORDER, WITHOUT PSYCHOTIC FEATURES: Primary | ICD-10-CM

## 2025-05-13 NOTE — PROGRESS NOTES
"Date: May 14, 2025  Time In: 1:55  Time out: 2:41      This provider is located at the [facility]), [address], [place] [address] using a secure [employer] Video Visit through [email or web address]. Patient is being seen remotely via telehealth, and they are in a secure environment for this session. The patient's condition being diagnosed/treated is appropriate for telemedicine. The provider identified herself as well as her credentials. The patient, and/or patients guardian, consent to be seen remotely, and when consent is given they understand that the consent allows for patient identifiable information to be sent to a third party as needed. They may refuse to be seen remotely at any time. The electronic data is encrypted and password protected, and the patient and/or guardian has been advised of the potential risks to privacy not withstanding such measures.     Mode of Visit: Video  Location of patient: Home  Location of provider: Provider home  You have chosen to receive care through a telehealth visit.  The patient has signed the video visit consent form.  The visit included audio and video interaction. No technical issues occurred during this visit    Subjective   Olga Hansen is a 59 y.o. female who presents today fully oriented, appropriately dressed and groomed, and open to communication for follow up appointment.    Chief Complaint:   Chief Complaint   Patient presents with    grief    Depression    Anxiety        History of Present Illness: Rapport was established through conversation and unconditional positive regard. Recent events were discussed and how these events impact Pt's emotional health.   Pt reports successful use of learned coping skills since last report.  Pt reports continuation of symptoms and states the intensity and duration of symptoms has remained unchanged since last report. Pt rates anxiety at 8/10 and depression at 8/10.  \"Not " "much different than last time\"    Sleep: Pt reports sleep has remained unchanged since last report. Healthy sleep habits were discussed such as maintaining a schedule, routine, avoiding caffeine, and limiting screen time before bed.    Appetite:  Pt reports appetite has been good since last report.  Discussed the importance of hydration and eating a healthy diet for overall and mental health.    Medication compliance: Pt reports medications are being taken daily as prescribed. Discussed the importance of compliance with prescriber's directions and Pt was instructed to report questions/concerns, as well as missed doses or discontinuation of medication by Pt.     Safety Plan in Place: Yes. Details: Reviewed with Patient Pt denies SI/HI/SIB recent or current    Daily Functioning:  Since last report, Pt states symptoms are causing Severe difficulty in daily functioning.      Content Discussion:   Pt reports life updates since previous session. Pt identified current stressors. Pt acknowledged stressors within and outside of one's control. Pt identified successes and issues with utilizing coping mechanisms.  Pt states the Sunday before Mother's Day Pt and youngest son went out to eat.  Afterward she came home and went to bed.  States on Mother's Day Pt went to her mother's home for about an hour and mowed Mom's grass after.  Then went to see MIL at the nursing home.   Pt states she is going to talk to her PCP about the ADHD medication then she will look into ADHD testing.  Pt reports she continues to have persistent grief and \"I'm still crying when I think about my son.\"  Pt was allowed to process some feelings over the last thing she said to him and saying goodbye.  Pt was able to gain some insight and relief when she concluded that her son did hear Pt's words and feel her hugging him.  Pt reports she feels she needs some closure, and she and youngest son will take ashes to a [employer] pond and place them under a " marker.  Pt states she feels having a place to memorialize him will help her move on.        Counselor supported Pt in continued exploration of underlying belief systems which contribute to difficulty in connecting/vulnerability.  Through discussion, Pt identifies themes of preservation and overt emotional and physical reactivity when physical and/or emotional statis is in question, even in low or perceived threatening situations. Counselor supported Pt in identifying past experiences and underlying beliefs which contribute to these feelings and reactions.  Pt was supported and encouraged in processing emotions related to frustrations with the expectations of self and others.  Pt was encouraged to identify cultural and nuclear familial contexts which contribute to these concerns.  Pt was receptive and engaged in conversation, and Pt reports this was beneficial and applicable to their situation.      Reviewed coping skills and encouraged Pt to continue to practicing coping skills daily when not under distress. Pt was praised for their attempts to decrease symptoms and mitigate activating events.    Clinical Maneuvering/Intervention:  CBT was utilized to encourage Pt to identify maladaptive thoughts and behaviors and replace with more affirming and positive.Pt encouraged to set and maintain appropriate and healthy boundaries with others. Pt was instructed to practice daily using appropriate and specific words to communicate feelings to others.  Motivational interviewing used to encourage Pt to identify strengths which can be utilized in working toward treatment goals. Pt encouraged to practice daily learned skills such as controlled breathing, grounding, and mindfulness. Pt was encouraged to ask for help from support persons to assist them in maintaining stability and alleviate symptoms. Discussed the importance of being one's own mental health advocate and to refrain from seeing the need to ask for help as a  weakness. Pt was encouraged to formulate a plan of action to be more proactive in managing stressors and refrain from using reactive and automatic heightened emotional responses.     Solution-focused therapy employed to identify how Pt would like their life to be if they were to make positive changes. Pt encouraged to identify effective coping skills and strengths they can use to continue utilizing those skills. Pt encouraged to discontinue utilizing non-effective coping mechanisms. Pt provided with feedback to highlight achievements and personal and other resources. Encouraged use of SMART goals    Assisted patient in processing above session content; acknowledged and normalized patient’s thoughts, feelings, and concerns.  Rationalized patient thought process regarding concerns presented at session.  Discussed triggers associated with patient's  anxiety , depression , and grief Also discussed coping skills for patient to implement such as mindfulness , self care , and positive self talk     Allowed patient to freely discuss issues without interruption or judgment. Provided safe, confidential environment to facilitate the development of positive therapeutic relationship and encourage open, honest communication. Assisted patient in identifying risk factors which would indicate the need for higher level of care including thoughts to harm self or others and/or self-harming behavior and encouraged patient to contact this office, call 911, or present to the nearest emergency room should any of these events occur. Discussed crisis intervention services and means to access. Patient adamantly and convincingly denies current suicidal or homicidal ideation or perceptual disturbance.    Assessment:     Patient appears to maintain relative stability as compared to their baseline.  However, patient persistently struggles with symptoms which continue to cause impairment in important areas of functioning.  As a result, they can be  reasonably expected to continue to benefit from treatment and would likely be at increased risk for decompensation otherwise.        Mental Status Exam:   Hygiene:   good  Cooperation:  Cooperative  Eye Contact:  Good  Psychomotor Behavior:  tearful and crying  Affect:   flat and sad  Mood: depressed and anxious  Speech:  Normal  Thought Process:  Goal directed  Thought Content:  Normal  Suicidal:  None  Homicidal: None  Hallucinations:  None  Delusion: None  Memory:  Intact  Orientation:  Grossly Intact  Reliability:  good  Insight:  Fair  Judgement:  Good  Impulse Control:  Good  Physical/Medical Issues:  No        Functional Status: Severe impairment    Progress toward goal: Not at goal    Prognosis: Good with continued therapeutic intervention        Plan:    Patient will continue in individual outpatient therapy with focus on improved functioning and coping skills, maintaining stability, and avoiding decompensation and the need for higher level of care.    Patient will adhere to medication regimen as prescribed and report any side effects. Patient will contact this office, call 911 or present to the nearest emergency room should suicidal or homicidal ideations occur. Provide Cognitive Behavioral Therapy and Solution Focused Therapy to improve functioning, maintain stability, and avoid decompensation and the need for higher level of care.     Return in about 2 weeks (around 5/27/2025).      VISIT DIAGNOSIS:    Diagnosis Plan   1. Severe episode of recurrent major depressive disorder, without psychotic features        2. Generalized anxiety disorder        3. Grief reaction with prolonged bereavement         13:59 EDT       This document has been electronically signed by JOLIE Murguia  May 14, 2025      Part of this note may be an electronic transcription/translation of spoken language to printed text using the Dragon Dictation System.

## 2025-05-14 NOTE — PATIENT INSTRUCTIONS
[place] warm Line: Phone number: [phone]      Monday through Friday 10 AM to 9 PM and Saturdays 5 PM to 9 PM.      A warmline is a NON-CRISIS number to call to get emotional support. You can call to have a conversation with someone who can provide support during hard times. Warmlines are staffed by trained peers who have been through their own mental health struggles and know what it's like to need help.      -Suicide hotline number: 988      -[facility] Resource Connection      The resource line is dedicated to providing behavioral health resources to residents of [place] and [facility], seven days a week, 7 a.m.-7 p.m.      [phone]      [email or web address]  [facility]alHealth      Should you ever need assistance or just want to reach out to someone when your behavioral health provider is not available due to the office being closed you can contact [email or web address]       -Just text HOME to [phone] and someone will reach out to you within a few minutes.  This is a 24/7 help line and they are open even on holidays.

## 2025-05-21 ENCOUNTER — TELEPHONE (OUTPATIENT)
Dept: PSYCHIATRY | Facility: CLINIC | Age: 60
End: 2025-05-21

## 2025-06-20 ENCOUNTER — TELEMEDICINE (OUTPATIENT)
Dept: PSYCHIATRY | Facility: CLINIC | Age: 60
End: 2025-06-20
Payer: COMMERCIAL

## 2025-06-20 DIAGNOSIS — F41.1 GAD (GENERALIZED ANXIETY DISORDER): ICD-10-CM

## 2025-06-20 DIAGNOSIS — F43.29 GRIEF REACTION WITH PROLONGED BEREAVEMENT: Primary | ICD-10-CM

## 2025-06-20 DIAGNOSIS — F33.2 SEVERE EPISODE OF RECURRENT MAJOR DEPRESSIVE DISORDER, WITHOUT PSYCHOTIC FEATURES: ICD-10-CM

## 2025-06-20 NOTE — PROGRESS NOTES
Date: June 20, 2025  Time In:   7:56  Time out: 8:31     Pt had to cut session short to attend a doctor visit.      This provider is located at the [facility] (through [facility]), [address], [place] [address] using a secure [employer] Video Visit through [email or web address]. Patient is being seen remotely via telehealth, and they are in a secure environment for this session. The patient's condition being diagnosed/treated is appropriate for telemedicine. The provider identified herself as well as her credentials. The patient, and/or patients guardian, consent to be seen remotely, and when consent is given they understand that the consent allows for patient identifiable information to be sent to a third party as needed. They may refuse to be seen remotely at any time. The electronic data is encrypted and password protected, and the patient and/or guardian has been advised of the potential risks to privacy not withstanding such measures.     Mode of Visit: Video  Location of patient: Home  Location of provider: Provider home  You have chosen to receive care through a telehealth visit.  The patient has signed the video visit consent form.  The visit included audio and video interaction. No technical issues occurred during this visit    Subjective   Olga Hansen is a 59 y.o. female who presents today fully oriented, appropriately dressed and groomed, and open to communication for follow up appointment.    Chief Complaint:   Chief Complaint   Patient presents with    ADHD    Anxiety    PTSD        History of Present Illness: Rapport was established through conversation and unconditional positive regard. Recent events were discussed and how these events impact Pt's emotional health.   Pt reports successful use of learned coping skills since last report.  Pt reports continuation of symptoms and states the intensity and duration of symptoms has remained unchanged since last report. Pt  rates anxiety at 7/10 and depression at 6/10.     Sleep: Pt reports sleep has remained unchanged since last report. Healthy sleep habits were discussed such as maintaining a schedule, routine, avoiding caffeine, and limiting screen time before bed.  Pt states she is in bed a lot.      Appetite:  Pt reports appetite has been good since last report.  Discussed the importance of hydration and eating a healthy diet for overall and mental health.  Pt notes improved appetite with the gabapentin.     Medication compliance: Pt reports medications are being taken daily as prescribed. Discussed the importance of compliance with prescriber's directions and Pt was instructed to report questions/concerns, as well as missed doses or discontinuation of medication by Pt.     Safety Plan in Place: Yes. Details: Reminded pt has safety plan in her MyChart.   Pt denies SI/HI/SIB recent or current    Daily Functioning:  Since last report, Pt states symptoms are causing Severe difficulty in daily functioning.      Content Discussion:   Pt reports life updates since previous session. Pt identified current stressors. Pt acknowledged stressors within and outside of one's control. Pt identified successes and issues with utilizing coping mechanisms.  Pt reports Mother was in hospital and she missed some appointments Pt is aware of the no show policy.  Pt states she has to go daily to Mom's house to check on her.  Pt reports she is in bed a lot and this is causing issues in her relationship. Pt missed last appt with VICTORIA Coleman for medication management, and Pt will call [facility] immediately after this session to re schedule missed appointment.  Pt states she keeps putting off taking ashes of son to the pond.  Discussed replacing the mental picture of her son in his hospital bed passing away with photos of him doing enjoyable activities.  Pt states she feels this will be helpful.  Pt processed some feelings associated with not being able to help  him at the end, and Pt states she will work on letting go of the guilt.  Pt admits she has been avoiding processing of his death, and Pt states she will work on allowing herself to feel instead of just thinking about it.  Pt and Counselor practiced grounding and breathing exercises for Pt to use during processing.      Counselor supported Pt in continued exploration of underlying belief systems which contribute to difficulty in connecting/vulnerability.  Through discussion, Pt identifies themes of preservation and overt emotional and physical reactivity when physical and/or emotional statis is in question, even in low or perceived threatening situations. Counselor supported Pt in identifying past experiences and underlying beliefs which contribute to these feelings and reactions.  Pt was supported and encouraged in processing emotions related to frustrations with the expectations of self and others.  Pt was encouraged to identify cultural and nuclear familial contexts which contribute to these concerns.  Pt was receptive and engaged in conversation, and Pt reports this was beneficial and applicable to their situation.      Reviewed coping skills and encouraged Pt to continue to practicing coping skills daily when not under distress. Pt was praised for their attempts to decrease symptoms and mitigate activating events.    Clinical Maneuvering/Intervention:  CBT was utilized to encourage Pt to identify maladaptive thoughts and behaviors and replace with more affirming and positive.Pt encouraged to set and maintain appropriate and healthy boundaries with others. Pt was instructed to practice daily using appropriate and specific words to communicate feelings to others.  Motivational interviewing used to encourage Pt to identify strengths which can be utilized in working toward treatment goals. Pt encouraged to practice daily learned skills such as controlled breathing, grounding, and mindfulness. Pt was encouraged to  ask for help from support persons to assist them in maintaining stability and alleviate symptoms. Discussed the importance of being one's own mental health advocate and to refrain from seeing the need to ask for help as a weakness. Pt was encouraged to formulate a plan of action to be more proactive in managing stressors and refrain from using reactive and automatic heightened emotional responses.     Solution-focused therapy employed to identify how Pt would like their life to be if they were to make positive changes. Pt encouraged to identify effective coping skills and strengths they can use to continue utilizing those skills. Pt encouraged to discontinue utilizing non-effective coping mechanisms. Pt provided with feedback to highlight achievements and personal and other resources. Encouraged use of SMART goals    Assisted patient in processing above session content; acknowledged and normalized patient’s thoughts, feelings, and concerns.  Rationalized patient thought process regarding concerns presented at session.  Discussed triggers associated with patient's  anxiety , depression , and grief Also discussed coping skills for patient to implement such as grounding , 4:6 breathing , and positive visualization    Allowed patient to freely discuss issues without interruption or judgment. Provided safe, confidential environment to facilitate the development of positive therapeutic relationship and encourage open, honest communication. Assisted patient in identifying risk factors which would indicate the need for higher level of care including thoughts to harm self or others and/or self-harming behavior and encouraged patient to contact this office, call 911, or present to the nearest emergency room should any of these events occur. Discussed crisis intervention services and means to access. Patient adamantly and convincingly denies current suicidal or homicidal ideation or perceptual disturbance.    Assessment:  "    Patient appears to maintain relative stability as compared to their baseline.  However, patient persistently struggles with symptoms which continue to cause impairment in important areas of functioning.  As a result, they can be reasonably expected to continue to benefit from treatment and would likely be at increased risk for decompensation otherwise.      Mental Status Exam:   Hygiene:   good  Pt was dressed nicely and appropriately groomed  Cooperation:  Cooperative  Eye Contact:  Good  Psychomotor Behavior:  Appropriate  Affect:  tired, \"down\" flat  Mood: sad, depressed, and anxious  Speech:  Normal  Thought Process:  Goal directed  Thought Content:  Normal and Mood congruent  Suicidal:  None  Homicidal: None  Hallucinations:  None  Delusion: None  Memory:  Intact  Orientation:  Grossly Intact  Reliability:  good  Insight:  Fair  Judgement:  Good  Impulse Control:  Good  Physical/Medical Issues:  No        Functional Status: severe impairment    Progress toward goal: Not at goal    Prognosis: Good with continued therapeutic intervention        Plan:    Patient will continue in individual outpatient therapy with focus on improved functioning and coping skills, maintaining stability, and avoiding decompensation and the need for higher level of care.    Patient will adhere to medication regimen as prescribed and report any side effects. Patient will contact this office, call 911 or present to the nearest emergency room should suicidal or homicidal ideations occur. Provide Cognitive Behavioral Therapy and Solution Focused Therapy to improve functioning, maintain stability, and avoid decompensation and the need for higher level of care.     Return in about 2 weeks (around 7/4/2025).      VISIT DIAGNOSIS:    Diagnosis Plan   1. Grief reaction with prolonged bereavement        2. MALACHI (generalized anxiety disorder)        3. Severe episode of recurrent major depressive disorder, without psychotic features         " 07:52 EDT       This document has been electronically signed by JOLIE Murguia  June 20, 2025      Part of this note may be an electronic transcription/translation of spoken language to printed text using the Dragon Dictation System.

## 2025-07-08 ENCOUNTER — TELEMEDICINE (OUTPATIENT)
Dept: PSYCHIATRY | Facility: CLINIC | Age: 60
End: 2025-07-08
Payer: COMMERCIAL

## 2025-07-08 DIAGNOSIS — F43.29 GRIEF REACTION WITH PROLONGED BEREAVEMENT: Primary | ICD-10-CM

## 2025-07-08 DIAGNOSIS — F41.1 GAD (GENERALIZED ANXIETY DISORDER): ICD-10-CM

## 2025-07-08 DIAGNOSIS — F33.2 SEVERE EPISODE OF RECURRENT MAJOR DEPRESSIVE DISORDER, WITHOUT PSYCHOTIC FEATURES: ICD-10-CM

## 2025-07-08 PROCEDURE — 90837 PSYTX W PT 60 MINUTES: CPT | Performed by: COUNSELOR

## 2025-07-08 NOTE — PROGRESS NOTES
Date: July 8, 2025  Time In: 10:06  Time out: 11:03      This provider is located at the [facility]), [address], [place] [address] using a secure [employer] Video Visit through [employer]. Patient is being seen remotely via telehealth, and they are in a secure environment for this session. The patient's condition being diagnosed/treated is appropriate for telemedicine. The provider identified herself as well as her credentials. The patient, and/or patients guardian, consent to be seen remotely, and when consent is given they understand that the consent allows for patient identifiable information to be sent to a third party as needed. They may refuse to be seen remotely at any time. The electronic data is encrypted and password protected, and the patient and/or guardian has been advised of the potential risks to privacy not withstanding such measures.     Mode of Visit: Video  Location of patient: Home  Location of provider: Provider home  You have chosen to receive care through a telehealth visit.  The patient has signed the video visit consent form.  The visit included audio and video interaction. No technical issues occurred during this visit    Subjective   Olga Hansen is a 59 y.o. female who presents today fully oriented, appropriately dressed and groomed, and open to communication for follow up appointment.    Chief Complaint:   Chief Complaint   Patient presents with    Anxiety    Depression        History of Present Illness: Rapport was established through conversation and unconditional positive regard. Recent events were discussed and how these events impact Pt's emotional health.   Pt reports successful use of learned coping skills since last report.  Pt reports continuation of symptoms and states the intensity and duration of symptoms has remained unchanged since last report. Pt rates anxiety at 7/10 and depression at 9/10.     Sleep: Pt  "reports sleep has remained unchanged since last report. Healthy sleep habits were discussed such as maintaining a schedule, routine, avoiding caffeine, and limiting screen time before bed.  Pt states \"I wake up a lot\"  and it takes average an hour to go back to sleep, but sometimes quicker.    Appetite:  Pt reports appetite has been good since last report.  Discussed the importance of hydration and eating a healthy diet for overall and mental health.    Medication compliance: Pt reports medications are being taken daily as prescribed. Discussed the importance of compliance with prescriber's directions and Pt was instructed to report questions/concerns, as well as missed doses or discontinuation of medication by Pt.   Pt states she saw her PCP after our last session and Pt's anti-depressant was increased.  Pt has a follow up appointment with DALLAS Case.      Safety Plan in Place: No Pt denies SI/HI/SIB recent or current    Daily Functioning:  Since last report, Pt states symptoms are causing Severe difficulty in daily functioning.      Content Discussion:   Pt reports life updates since previous session. Pt identified current stressors. Pt acknowledged stressors within and outside of one's control. Pt identified successes and issues with utilizing coping mechanisms. Pt reports she continues to have depression and anxiety, and she is open to learning and utilizing coping skills to assist her in managing her anxiety.  Pt states she has been staying home a lot, and she has a lot of time to think.  Pt states she has been going to her parents' home and making sure they have groceries and their yard is mowed. Pt states she has not driven since son's death.  Pt states she does not feel comfortable enough to drive due to her anxiety.  \"It's a nervous feeling.\"   Pt was shown samples of different types of journaling using doodles and words and drawing, and Pt states she feels this is something she would like to try.  Pt " "states she will set aside time to journal around 8-9 pm and Pt will sit in her bed and cover up with her bedspread and journal each night. Discussed using essential oil, slow music, 4:6 breathing (demonstrated), and positive visualization to mitigate anxiety and depression.  Pt states she will practice at least twice a day.  \"This will really help me I think.\"        Counselor supported Pt in continued exploration of underlying belief systems which contribute to difficulty in connecting/vulnerability.  Through discussion, Pt identifies themes of preservation and overt emotional and physical reactivity when physical and/or emotional statis is in question, even in low or perceived threatening situations. Counselor supported Pt in identifying past experiences and underlying beliefs which contribute to these feelings and reactions.  Pt was supported and encouraged in processing emotions related to frustrations with the expectations of self and others.  Pt was encouraged to identify cultural and nuclear familial contexts which contribute to these concerns.  Pt was receptive and engaged in conversation, and Pt reports this was beneficial and applicable to their situation.      Reviewed coping skills and encouraged Pt to continue to practicing coping skills daily when not under distress. Pt was praised for their attempts to decrease symptoms and mitigate activating events.    Clinical Maneuvering/Intervention:  CBT was utilized to encourage Pt to identify maladaptive thoughts and behaviors and replace with more affirming and positive.Pt encouraged to set and maintain appropriate and healthy boundaries with others. Pt was instructed to practice daily using appropriate and specific words to communicate feelings to others.  Motivational interviewing used to encourage Pt to identify strengths which can be utilized in working toward treatment goals. Pt encouraged to practice daily learned skills such as controlled breathing, " grounding, and mindfulness. Pt was encouraged to ask for help from support persons to assist them in maintaining stability and alleviate symptoms. Discussed the importance of being one's own mental health advocate and to refrain from seeing the need to ask for help as a weakness. Pt was encouraged to formulate a plan of action to be more proactive in managing stressors and refrain from using reactive and automatic heightened emotional responses.     Solution-focused therapy employed to identify how Pt would like their life to be if they were to make positive changes. Pt encouraged to identify effective coping skills and strengths they can use to continue utilizing those skills. Pt encouraged to discontinue utilizing non-effective coping mechanisms. Pt provided with feedback to highlight achievements and personal and other resources. Encouraged use of SMART goals    Assisted patient in processing above session content; acknowledged and normalized patient’s thoughts, feelings, and concerns.  Rationalized patient thought process regarding concerns presented at session.  Discussed triggers associated with patient's  anxiety  Also discussed coping skills for patient to implement such as mindfulness , increasing activity , self care , and positive self talk     Allowed patient to freely discuss issues without interruption or judgment. Provided safe, confidential environment to facilitate the development of positive therapeutic relationship and encourage open, honest communication. Assisted patient in identifying risk factors which would indicate the need for higher level of care including thoughts to harm self or others and/or self-harming behavior and encouraged patient to contact this office, call 911, or present to the nearest emergency room should any of these events occur. Discussed crisis intervention services and means to access. Patient adamantly and convincingly denies current suicidal or homicidal ideation or  perceptual disturbance.    Assessment:     Patient appears to maintain relative stability as compared to their baseline.  However, patient persistently struggles with symptoms which continue to cause impairment in important areas of functioning.  As a result, they can be reasonably expected to continue to benefit from treatment and would likely be at increased risk for decompensation otherwise.      PHQ-Score Total:  PHQ-9 Total Score: PHQ-9 Depression Screening  Little interest or pleasure in doing things?  3   Feeling down, depressed, or hopeless?  2   PHQ-2 Total Score     Trouble falling or staying asleep, or sleeping too much?  2   Feeling tired or having little energy?  3   Poor appetite or overeating?  0   Feeling bad about yourself - or that you are a failure or have let yourself or your family down?  3   Trouble concentrating on things, such as reading the newspaper or watching television?  2   Moving or speaking so slowly that other people could have noticed? Or the opposite - being so fidgety or restless that you have been moving around a lot more than usual?  2   Thoughts that you would be better off dead, or of hurting yourself in some way?  0   PHQ-9 Total Score  17   If you checked off any problems, how difficult have these problems made it for you to do your work, take care of things at home, or get along with other people?  Very             Mental Status Exam:   Hygiene:   good  Cooperation:  Cooperative   eager to try learned skills  Eye Contact:  Good  Psychomotor Behavior:  Appropriate  Affect:  Restricted and worried  Hopeful that coping skills will help  Mood: sad, depressed, and anxious  Speech:  Normal  Thought Process:  Goal directed  Thought Content:  Normal and Mood congruent  Suicidal:  None  Homicidal: None  Hallucinations:  None  Delusion: None  Memory:  Intact  Orientation:  Grossly Intact  Reliability:  good  Insight:  Good  Judgement:  Fair  Impulse Control:  Good  Physical/Medical  Issues:  No        Functional Status: Severe impairment    Progress toward goal: Not at goal    Prognosis: Good with continued therapeutic intervention        Plan:    Patient will continue in individual outpatient therapy with focus on improved functioning and coping skills, maintaining stability, and avoiding decompensation and the need for higher level of care.    Patient will adhere to medication regimen as prescribed and report any side effects. Patient will contact this office, call 911 or present to the nearest emergency room should suicidal or homicidal ideations occur. Provide Cognitive Behavioral Therapy and Solution Focused Therapy to improve functioning, maintain stability, and avoid decompensation and the need for higher level of care.     Return in about 2 weeks (around 7/22/2025).      VISIT DIAGNOSIS:    Diagnosis Plan   1. Grief reaction with prolonged bereavement        2. MALACHI (generalized anxiety disorder)        3. Severe episode of recurrent major depressive disorder, without psychotic features         10:06 EDT       This document has been electronically signed by Zahraa Noyola [facility]  July 8, 2025      Part of this note may be an electronic transcription/translation of spoken language to printed text using the Dragon Dictation System.

## 2025-07-09 NOTE — PATIENT INSTRUCTIONS
[place] warm Line: Phone number: [phone]      Monday through Friday 10 AM to 9 PM and Saturdays 5 PM to 9 PM.      A warmline is a NON-CRISIS number to call to get emotional support. You can call to have a conversation with someone who can provide support during hard times. Warmlines are staffed by trained peers who have been through their own mental health struggles and know what it's like to need help.      -Suicide hotline number: 988      -[facility] Resource Connection      The resource line is dedicated to providing behavioral health resources to residents of [place] and [facility], seven days a week, 7 a.m.-7 p.m.      [phone]      [email or web address]  [facility]alHealth      Should you ever need assistance or just want to reach out to someone when your behavioral health provider is not available due to the office being closed you can contact [email or web address].       -Just text HOME to [phone] and someone will reach out to you within a few minutes.  This is a 24/7 help line and they are open even on holidays.

## 2025-07-21 RX ORDER — FLUOXETINE HYDROCHLORIDE 40 MG/1
40 CAPSULE ORAL DAILY
Qty: 30 CAPSULE | Refills: 0 | Status: SHIPPED | OUTPATIENT
Start: 2025-07-21

## 2025-07-21 NOTE — TELEPHONE ENCOUNTER
Patient needs fluoxetine 40mg reffilled.  Patient states her PCP added fluoxetine 20mg to the 40mg last month but patient now states she is out of the 40mg. Please advise.

## 2025-07-30 ENCOUNTER — TELEMEDICINE (OUTPATIENT)
Dept: PSYCHIATRY | Facility: CLINIC | Age: 60
End: 2025-07-30
Payer: COMMERCIAL

## 2025-07-30 DIAGNOSIS — F33.2 SEVERE EPISODE OF RECURRENT MAJOR DEPRESSIVE DISORDER, WITHOUT PSYCHOTIC FEATURES: ICD-10-CM

## 2025-07-30 DIAGNOSIS — F43.29 GRIEF REACTION WITH PROLONGED BEREAVEMENT: Primary | ICD-10-CM

## 2025-07-30 DIAGNOSIS — F41.1 GAD (GENERALIZED ANXIETY DISORDER): ICD-10-CM

## 2025-07-30 NOTE — PROGRESS NOTES
Date: August 2, 2025  Time In: 2:02  Time out: 2:56      This provider is located at the [facility] (through [facility]), [address], [place] [address] using a secure [employer] Video Visit through [employer]. Patient is being seen remotely via telehealth, and they are in a secure environment for this session. The patient's condition being diagnosed/treated is appropriate for telemedicine. The provider identified herself as well as her credentials. The patient, and/or patients guardian, consent to be seen remotely, and when consent is given they understand that the consent allows for patient identifiable information to be sent to a third party as needed. They may refuse to be seen remotely at any time. The electronic data is encrypted and password protected, and the patient and/or guardian has been advised of the potential risks to privacy not withstanding such measures.     Mode of Visit: Video  Location of patient: Home  Location of provider: Provider home  You have chosen to receive care through a telehealth visit.  The patient has signed the video visit consent form.  The visit included audio and video interaction. No technical issues occurred during this visit    Subjective   Olga Hansen is a 59 y.o. female who presents today fully oriented, appropriately dressed and groomed, and open to communication for follow up appointment.    Chief Complaint:   Chief Complaint   Patient presents with    Anxiety    Depression    Grief    Sleeping Problem        History of Present Illness: Rapport was established through conversation and unconditional positive regard. Recent events were discussed and how these events impact Pt's emotional health.   Pt reports successful use of learned coping skills since last report.  Pt reports continuation of symptoms and states the intensity and duration of symptoms has worsened since last report. Pt rates anxiety at 8/10 and depression at  "8/10.     Sleep: Pt reports sleep has improved since last report. Healthy sleep habits were discussed such as maintaining a schedule, routine, avoiding caffeine, and limiting screen time before bed. \"Sleep has gotten a little better.\"      Appetite:  Pt reports appetite has been good since last report.  Discussed the importance of hydration and eating a healthy diet for overall and mental health.    Medication compliance: Pt reports medications are being taken daily as prescribed. Discussed the importance of compliance with prescriber's directions and Pt was instructed to report questions/concerns, as well as missed doses or discontinuation of medication by Pt.     Safety Plan in Place: No Pt denies SI/HI/SIB recent or current    Daily Functioning:  Since last report, Pt states symptoms are causing Moderate difficulty in daily functioning.      Content Discussion:   Pt reports life updates since previous session. Pt identified current stressors. Pt acknowledged stressors within and outside of one's control. Pt identified successes and issues with utilizing coping mechanisms.  Pt states she continues to have depression and \"I feel terrible\" and \"no energy to get up and get out of bed.\"  Pt states she does not cry every day like she did after son's death.  Pt states she feels a lot of her issue is not having distraction, a she is retired so she melgar not do a lot.  Pt states she stays in her room a lot and watches TV, sometimes most of the day.  Pt states she likes to draw and \"I really don't have much interest in anything anymore.\"  Discussed the need to have purposeful and intentional SMART goals. Discussed breaking into increments.  Nothing is too small to be a goal.  Pt will write down goals everyday and check them off her list to acknowledge successes.  Discussed holding self accountable and capable of doing small things to improve mood and create a forward momentum in her life.    Counselor supported Pt in " continued exploration of underlying belief systems which contribute to difficulty in connecting/vulnerability.  Through discussion, Pt identifies themes of preservation and overt emotional and physical reactivity when physical and/or emotional statis is in question, even in low or perceived threatening situations. Counselor supported Pt in identifying past experiences and underlying beliefs which contribute to these feelings and reactions.  Pt was supported and encouraged in processing emotions related to frustrations with the expectations of self and others.  Pt was encouraged to identify cultural and nuclear familial contexts which contribute to these concerns.  Pt was receptive and engaged in conversation, and Pt reports this was beneficial and applicable to their situation.      Reviewed coping skills and encouraged Pt to continue to practicing coping skills daily when not under distress. Pt was praised for their attempts to decrease symptoms and mitigate activating events.    Clinical Maneuvering/Intervention:  CBT was utilized to encourage Pt to identify maladaptive thoughts and behaviors and replace with more affirming and positive.Pt encouraged to set and maintain appropriate and healthy boundaries with others. Pt was instructed to practice daily using appropriate and specific words to communicate feelings to others.  Motivational interviewing used to encourage Pt to identify strengths which can be utilized in working toward treatment goals. Pt encouraged to practice daily learned skills such as controlled breathing, grounding, and mindfulness. Pt was encouraged to ask for help from support persons to assist them in maintaining stability and alleviate symptoms. Discussed the importance of being one's own mental health advocate and to refrain from seeing the need to ask for help as a weakness. Pt was encouraged to formulate a plan of action to be more proactive in managing stressors and refrain from using  reactive and automatic heightened emotional responses.     Solution-focused therapy employed to identify how Pt would like their life to be if they were to make positive changes. Pt encouraged to identify effective coping skills and strengths they can use to continue utilizing those skills. Pt encouraged to discontinue utilizing non-effective coping mechanisms. Pt provided with feedback to highlight achievements and personal and other resources. Encouraged use of SMART goals    Assisted patient in processing above session content; acknowledged and normalized patient’s thoughts, feelings, and concerns.  Rationalized patient thought process regarding concerns presented at session.  Discussed triggers associated with patient's  anxiety  and depression  Also discussed coping skills for patient to implement such as mindfulness , self care , and positive self talk     Allowed patient to freely discuss issues without interruption or judgment. Provided safe, confidential environment to facilitate the development of positive therapeutic relationship and encourage open, honest communication. Assisted patient in identifying risk factors which would indicate the need for higher level of care including thoughts to harm self or others and/or self-harming behavior and encouraged patient to contact this office, call 911, or present to the nearest emergency room should any of these events occur. Discussed crisis intervention services and means to access. Patient adamantly and convincingly denies current suicidal or homicidal ideation or perceptual disturbance.    Assessment:     Patient appears to maintain relative stability as compared to their baseline.  However, patient persistently struggles with symptoms which continue to cause impairment in important areas of functioning.  As a result, they can be reasonably expected to continue to benefit from treatment and would likely be at increased risk for decompensation  "otherwise.      Mental Status Exam:   Hygiene:   good  Cooperation:  Cooperative  Eye Contact:  Good  Psychomotor Behavior:  normal  Affect:  \"down\" worried, upset  Mood: depressed and anxious  Speech:  Normal  Thought Process:  Goal directed and Linear  Thought Content:  Normal and Mood congruent  Suicidal:  None  Pt adamantly denies SI/HI/SIB  Homicidal: None  Hallucinations:  None  Delusion: None  Memory:  Intact  Orientation:  Grossly Intact  Reliability:  good  Insight: good  Judgement:  Fair  Impulse Control:  Good  Physical/Medical Issues:  No        Functional Status: Severe impairment    Progress toward goal: Not at goal    Prognosis: Good with continued therapeutic intervention        Plan:    Patient will continue in individual outpatient therapy with focus on improved functioning and coping skills, maintaining stability, and avoiding decompensation and the need for higher level of care.    Patient will adhere to medication regimen as prescribed and report any side effects. Patient will contact this office, call 911 or present to the nearest emergency room should suicidal or homicidal ideations occur. Provide Cognitive Behavioral Therapy and Solution Focused Therapy to improve functioning, maintain stability, and avoid decompensation and the need for higher level of care.     Return in about 2 weeks (around 8/13/2025).      VISIT DIAGNOSIS:    Diagnosis Plan   1. Grief reaction with prolonged bereavement        2. Severe episode of recurrent major depressive disorder, without psychotic features        3. MALACHI (generalized anxiety disorder)         14:02 EDT       This document has been electronically signed by JOLIE Murguia  August 2, 2025      Part of this note may be an electronic transcription/translation of spoken language to printed text using the Dragon Dictation System.  "

## 2025-08-07 ENCOUNTER — TELEMEDICINE (OUTPATIENT)
Dept: PSYCHIATRY | Facility: CLINIC | Age: 60
End: 2025-08-07
Payer: COMMERCIAL

## 2025-08-07 DIAGNOSIS — F41.1 GENERALIZED ANXIETY DISORDER: Chronic | ICD-10-CM

## 2025-08-07 DIAGNOSIS — F43.29 GRIEF REACTION WITH PROLONGED BEREAVEMENT: Chronic | ICD-10-CM

## 2025-08-07 DIAGNOSIS — F33.2 SEVERE EPISODE OF RECURRENT MAJOR DEPRESSIVE DISORDER, WITHOUT PSYCHOTIC FEATURES: Primary | Chronic | ICD-10-CM

## 2025-08-07 RX ORDER — FLUOXETINE HYDROCHLORIDE 40 MG/1
80 CAPSULE ORAL DAILY
Qty: 60 CAPSULE | Refills: 0 | Status: SHIPPED | OUTPATIENT
Start: 2025-08-07